# Patient Record
Sex: MALE | Race: WHITE | HISPANIC OR LATINO | URBAN - METROPOLITAN AREA
[De-identification: names, ages, dates, MRNs, and addresses within clinical notes are randomized per-mention and may not be internally consistent; named-entity substitution may affect disease eponyms.]

---

## 2018-01-23 ENCOUNTER — OUTPATIENT (OUTPATIENT)
Dept: OUTPATIENT SERVICES | Facility: HOSPITAL | Age: 53
LOS: 1 days | Discharge: HOME | End: 2018-01-23

## 2018-01-23 DIAGNOSIS — I25.10 ATHEROSCLEROTIC HEART DISEASE OF NATIVE CORONARY ARTERY WITHOUT ANGINA PECTORIS: ICD-10-CM

## 2019-04-11 ENCOUNTER — APPOINTMENT (OUTPATIENT)
Dept: UROLOGY | Facility: CLINIC | Age: 54
End: 2019-04-11
Payer: MEDICAID

## 2019-04-11 ENCOUNTER — OUTPATIENT (OUTPATIENT)
Dept: OUTPATIENT SERVICES | Facility: HOSPITAL | Age: 54
LOS: 1 days | Discharge: HOME | End: 2019-04-11

## 2019-04-11 VITALS
HEART RATE: 76 BPM | WEIGHT: 244 LBS | HEIGHT: 73 IN | DIASTOLIC BLOOD PRESSURE: 123 MMHG | SYSTOLIC BLOOD PRESSURE: 174 MMHG | BODY MASS INDEX: 32.34 KG/M2

## 2019-04-11 DIAGNOSIS — N52.01 ERECTILE DYSFUNCTION DUE TO ARTERIAL INSUFFICIENCY: ICD-10-CM

## 2019-04-11 DIAGNOSIS — N28.1 CYST OF KIDNEY, ACQUIRED: ICD-10-CM

## 2019-04-11 DIAGNOSIS — R97.20 ELEVATED PROSTATE SPECIFIC ANTIGEN [PSA]: ICD-10-CM

## 2019-04-11 DIAGNOSIS — R39.9 UNSPECIFIED SYMPTOMS AND SIGNS INVOLVING THE GENITOURINARY SYSTEM: ICD-10-CM

## 2019-04-11 DIAGNOSIS — Z80.42 FAMILY HISTORY OF MALIGNANT NEOPLASM OF PROSTATE: ICD-10-CM

## 2019-04-11 DIAGNOSIS — Z86.39 PERSONAL HISTORY OF OTHER ENDOCRINE, NUTRITIONAL AND METABOLIC DISEASE: ICD-10-CM

## 2019-04-11 DIAGNOSIS — Z86.79 PERSONAL HISTORY OF OTHER DISEASES OF THE CIRCULATORY SYSTEM: ICD-10-CM

## 2019-04-11 DIAGNOSIS — Z78.9 OTHER SPECIFIED HEALTH STATUS: ICD-10-CM

## 2019-04-11 PROCEDURE — 99205 OFFICE O/P NEW HI 60 MIN: CPT

## 2019-04-11 NOTE — ASSESSMENT
[FreeTextEntry1] : This is a 53 year male who presents with elevated PSA for the last 5 years.   most recent PSA from March 2019 showed PSA of 10.  states that has never had a prostate biopsy.  has lower back pain and arm pain thinks from arthritis. no blood in the urine.  uncle has prostate cancer. \par \par outside medical records from [facility] reviewed:\par normal creatinine 0.72\par PSA 10.5\par November 2018 sonogram: 2mm right kidney stone, complex left renal cysts, heterogeneous liver\par \par IPSS = severity of 23 pf 35.  drink a lot of water and coffee\par \par IIEF 5 = 11- severe ED\par

## 2019-04-11 NOTE — PHYSICAL EXAM
[General Appearance - Well Developed] : well developed [Well Groomed] : well groomed [General Appearance - Well Nourished] : well nourished [Normal Appearance] : normal appearance [General Appearance - In No Acute Distress] : no acute distress [Edema] : no peripheral edema [Respiration, Rhythm And Depth] : normal respiratory rhythm and effort [Exaggerated Use Of Accessory Muscles For Inspiration] : no accessory muscle use [Abdomen Soft] : soft [Costovertebral Angle Tenderness] : no ~M costovertebral angle tenderness [Abdomen Tenderness] : non-tender [No Prostate Nodules] : no prostate nodules [Prostate Size ___ gm] : prostate size [unfilled] gm [Skin Color & Pigmentation] : normal skin color and pigmentation [Normal Station and Gait] : the gait and station were normal for the patient's age [] : no rash [No Focal Deficits] : no focal deficits [Oriented To Time, Place, And Person] : oriented to person, place, and time [Mood] : the mood was normal [Affect] : the affect was normal [Not Anxious] : not anxious [Femoral Lymph Nodes Enlarged Bilaterally] : femoral [Inguinal Lymph Nodes Enlarged Bilaterally] : inguinal

## 2019-04-11 NOTE — HISTORY OF PRESENT ILLNESS
[FreeTextEntry1] : This is a 53 year male who presents with elevated PSA for the last 5 years.   most recent PSA from March 2019 showed PSA of 10.  states that has never had a prostate biopsy.  has lower back pain and arm pain thinks from arthritis. no blood in the urine.  uncle has prostate cancer. \par \par outside medical records from [facility] reviewed:\par normal creatinine 0.72\par PSA 10.5\par November 2018 sonogram: 2mm right kidney stone, complex left renal cysts, heterogeneous liver\par \par IPSS = severity of 23 pf 35.  drink a lot of water and coffee\par \par IIEF 5 = 11- severe ED\par

## 2019-04-16 LAB
ANION GAP SERPL CALC-SCNC: 10 MMOL/L
BUN SERPL-MCNC: 14 MG/DL
CALCIUM SERPL-MCNC: 8.5 MG/DL
CHLORIDE SERPL-SCNC: 105 MMOL/L
CO2 SERPL-SCNC: 23 MMOL/L
CREAT SERPL-MCNC: 0.8 MG/DL
GLUCOSE SERPL-MCNC: 155 MG/DL
POTASSIUM SERPL-SCNC: 4.3 MMOL/L
SODIUM SERPL-SCNC: 138 MMOL/L

## 2019-04-18 ENCOUNTER — APPOINTMENT (OUTPATIENT)
Dept: UROLOGY | Facility: CLINIC | Age: 54
End: 2019-04-18

## 2019-04-19 ENCOUNTER — FORM ENCOUNTER (OUTPATIENT)
Age: 54
End: 2019-04-19

## 2019-04-20 ENCOUNTER — OUTPATIENT (OUTPATIENT)
Dept: OUTPATIENT SERVICES | Facility: HOSPITAL | Age: 54
LOS: 1 days | Discharge: HOME | End: 2019-04-20
Payer: MEDICAID

## 2019-04-20 DIAGNOSIS — N28.1 CYST OF KIDNEY, ACQUIRED: ICD-10-CM

## 2019-04-20 PROCEDURE — 74178 CT ABD&PLV WO CNTR FLWD CNTR: CPT | Mod: 26

## 2019-05-09 ENCOUNTER — APPOINTMENT (OUTPATIENT)
Dept: UROLOGY | Facility: CLINIC | Age: 54
End: 2019-05-09

## 2019-05-09 ENCOUNTER — APPOINTMENT (OUTPATIENT)
Dept: UROLOGY | Facility: CLINIC | Age: 54
End: 2019-05-09
Payer: MEDICAID

## 2019-05-09 ENCOUNTER — OUTPATIENT (OUTPATIENT)
Dept: OUTPATIENT SERVICES | Facility: HOSPITAL | Age: 54
LOS: 1 days | Discharge: HOME | End: 2019-05-09

## 2019-05-09 VITALS
HEIGHT: 73 IN | HEART RATE: 85 BPM | DIASTOLIC BLOOD PRESSURE: 114 MMHG | SYSTOLIC BLOOD PRESSURE: 158 MMHG | BODY MASS INDEX: 31.81 KG/M2 | WEIGHT: 240 LBS

## 2019-05-09 DIAGNOSIS — N28.1 CYST OF KIDNEY, ACQUIRED: ICD-10-CM

## 2019-05-09 DIAGNOSIS — R97.20 ELEVATED PROSTATE SPECIFIC ANTIGEN [PSA]: ICD-10-CM

## 2019-05-09 DIAGNOSIS — D30.01 BENIGN NEOPLASM OF RIGHT KIDNEY: ICD-10-CM

## 2019-05-09 DIAGNOSIS — R39.9 UNSPECIFIED SYMPTOMS AND SIGNS INVOLVING THE GENITOURINARY SYSTEM: ICD-10-CM

## 2019-05-09 PROCEDURE — 99213 OFFICE O/P EST LOW 20 MIN: CPT

## 2019-05-09 NOTE — ASSESSMENT
[FreeTextEntry1] : This is a 53 year male who presents with elevated PSA for the last 5 years. most recent PSA from March 2019 showed PSA of 10. states that has never had a prostate biopsy. has lower back pain and arm pain thinks from arthritis. no blood in the urine. uncle has prostate cancer. is scheduled for prostate biopsy next week but Dr. Garcia advised for transperienal prostate biopsy instead\par \par outside medical records from [facility] reviewed:\par normal creatinine 0.72\par PSA 10.5\par November 2018 sonogram: 2mm right kidney stone, complex left renal cysts, heterogeneous liver\par \par IPSS = severity of 23 pf 35. drink a lot of water and coffee -- started on flomax last visit\par \par IIEF 5 = 11- severe ED\par \par April 2019\par Basic Metabolic Panel;- Cr 0.8\par CT Urogram w/wo Cont: left renal simple cysts up to 3cm.  right renal 0.5cm angiomyolipoma, prostate enlargement\par

## 2019-05-09 NOTE — PHYSICAL EXAM
[General Appearance - Well Developed] : well developed [General Appearance - Well Nourished] : well nourished [Normal Appearance] : normal appearance [Well Groomed] : well groomed [General Appearance - In No Acute Distress] : no acute distress [Abdomen Soft] : soft [Abdomen Tenderness] : non-tender [Costovertebral Angle Tenderness] : no ~M costovertebral angle tenderness [No Prostate Nodules] : no prostate nodules [Prostate Size ___ gm] : prostate size [unfilled] gm [Skin Color & Pigmentation] : normal skin color and pigmentation [Edema] : no peripheral edema [] : no respiratory distress [Respiration, Rhythm And Depth] : normal respiratory rhythm and effort [Exaggerated Use Of Accessory Muscles For Inspiration] : no accessory muscle use [Oriented To Time, Place, And Person] : oriented to person, place, and time [Affect] : the affect was normal [Mood] : the mood was normal [Normal Station and Gait] : the gait and station were normal for the patient's age [Not Anxious] : not anxious [No Focal Deficits] : no focal deficits [Inguinal Lymph Nodes Enlarged Bilaterally] : inguinal [Femoral Lymph Nodes Enlarged Bilaterally] : femoral

## 2019-05-09 NOTE — HISTORY OF PRESENT ILLNESS
[FreeTextEntry1] : This is a 53 year male who presents with elevated PSA for the last 5 years. most recent PSA from March 2019 showed PSA of 10. states that has never had a prostate biopsy. has lower back pain and arm pain thinks from arthritis. no blood in the urine. uncle has prostate cancer. is scheduled for prostate biopsy next week but Dr. Garcia advised for transperienal prostate biopsy instead\par \par outside medical records from [facility] reviewed:\par normal creatinine 0.72\par PSA 10.5\par November 2018 sonogram: 2mm right kidney stone, complex left renal cysts, heterogeneous liver\par \par IPSS = severity of 23 pf 35. drink a lot of water and coffee -- started on flomax last visit\par \par IIEF 5 = 11- severe ED\par \par April 2019\par Basic Metabolic Panel;- Cr 0.8\par CT Urogram w/wo Cont: left renal simple cysts up to 3cm.  right renal 0.5cm angiomyolipoma, prostate enlargement\par \par

## 2019-05-17 ENCOUNTER — APPOINTMENT (OUTPATIENT)
Dept: UROLOGY | Facility: CLINIC | Age: 54
End: 2019-05-17

## 2019-07-12 ENCOUNTER — APPOINTMENT (OUTPATIENT)
Dept: UROLOGY | Facility: CLINIC | Age: 54
End: 2019-07-12
Payer: MEDICAID

## 2019-07-12 VITALS
SYSTOLIC BLOOD PRESSURE: 145 MMHG | HEIGHT: 73 IN | HEART RATE: 76 BPM | WEIGHT: 240 LBS | BODY MASS INDEX: 31.81 KG/M2 | DIASTOLIC BLOOD PRESSURE: 98 MMHG

## 2019-07-12 PROCEDURE — 99213 OFFICE O/P EST LOW 20 MIN: CPT

## 2019-07-12 NOTE — LETTER BODY
[Dear  ___] : Dear  [unfilled], [Courtesy Letter:] : I had the pleasure of seeing your patient, [unfilled], in my office today. [Please see my note below.] : Please see my note below. [Sincerely,] : Sincerely, [FreeTextEntry2] : Dany Garcia MD\par [address]\[place] [address]\par

## 2019-07-12 NOTE — PHYSICAL EXAM
[General Appearance - Well Developed] : well developed [General Appearance - Well Nourished] : well nourished [Normal Appearance] : normal appearance [Well Groomed] : well groomed [] : no respiratory distress [General Appearance - In No Acute Distress] : no acute distress [Respiration, Rhythm And Depth] : normal respiratory rhythm and effort [Exaggerated Use Of Accessory Muscles For Inspiration] : no accessory muscle use [Affect] : the affect was normal [Mood] : the mood was normal [Oriented To Time, Place, And Person] : oriented to person, place, and time [Not Anxious] : not anxious [Normal Station and Gait] : the gait and station were normal for the patient's age

## 2019-07-12 NOTE — LETTER HEADER
[FreeTextEntry3] : Brian Swenson M.D.\par Director of Urology\par [facility]\par [address] [place] [address], [address]\[place] [address]

## 2019-07-12 NOTE — ASSESSMENT
[FreeTextEntry1] : I gave him four choices. One he can continue to wait we do have the probe we still don’t have appropriate needle guides and I don’t have a set time by when I will get it. However he is only 54 years old and his PSA was over 10 and I think we’ve waited long enough. \par Number two we can do it here the standard method up until now which is transrectal which depending on the study has between 2 and a 4% incidence of sepsis. I have not had someone have sepsis from this that I can recall but one of my associates had someone only a few months ago that spent over a week in the ICU because of this and that’s not my first choice. However he has no one to drive him back, his wife doesn’t drive his kids don’t drive and though he has friends with cars they are all working and he does not want to inconvenience any of them. He is willing to take the risk of sepsis and that is probably his first choice \par we still reviewed the other options which include having my staff once again trying coordinate my doing it at our the [facility] with me finally the option that I am recommending , knowing that he has trouble getting out there and back, is that he go to Dr. Aden who does them on a weekly basis. That way we don’t have to worry about when and how it will be coordinated he just goes there and gets it done. He can have the follow-up from the biopsy done here and that would not be a problem both for the exam and to review the results. \par

## 2019-07-12 NOTE — HISTORY OF PRESENT ILLNESS
[FreeTextEntry1] : Arie was seen in May by Dr. Sunshine who wanted to do a trans rectal biopsy. Because of the increased risks of sepsis his primary care doctor really wanted us to do a trans perineal. At that time we were fairly sure we would have the equipment sooner than later or that I personally would have the ability to do it out at our center in [place] so Arie was waiting. We finally got the perineal probe and were going to do the biopsy here in and we found out the equipment that we got here did not include the necessary accoutrements so we could do it safely. \par \par He was called it today so we can discuss the methods by which we can have it done perineal and the risks if he chose not to accept the inconveniences of the perineal route of doing it transrectal. \par

## 2019-07-31 ENCOUNTER — APPOINTMENT (OUTPATIENT)
Dept: UROLOGY | Facility: CLINIC | Age: 54
End: 2019-07-31

## 2019-08-09 ENCOUNTER — APPOINTMENT (OUTPATIENT)
Dept: UROLOGY | Facility: CLINIC | Age: 54
End: 2019-08-09

## 2019-08-28 ENCOUNTER — EMERGENCY (EMERGENCY)
Facility: HOSPITAL | Age: 54
LOS: 0 days | Discharge: HOME | End: 2019-08-28
Attending: EMERGENCY MEDICINE | Admitting: EMERGENCY MEDICINE
Payer: MEDICAID

## 2019-08-28 VITALS
SYSTOLIC BLOOD PRESSURE: 199 MMHG | HEART RATE: 75 BPM | OXYGEN SATURATION: 98 % | RESPIRATION RATE: 20 BRPM | WEIGHT: 240.08 LBS | DIASTOLIC BLOOD PRESSURE: 177 MMHG | TEMPERATURE: 97 F | HEIGHT: 73 IN

## 2019-08-28 VITALS
HEART RATE: 74 BPM | DIASTOLIC BLOOD PRESSURE: 104 MMHG | SYSTOLIC BLOOD PRESSURE: 175 MMHG | RESPIRATION RATE: 18 BRPM | OXYGEN SATURATION: 98 %

## 2019-08-28 DIAGNOSIS — R07.89 OTHER CHEST PAIN: ICD-10-CM

## 2019-08-28 DIAGNOSIS — R06.02 SHORTNESS OF BREATH: ICD-10-CM

## 2019-08-28 DIAGNOSIS — R00.2 PALPITATIONS: ICD-10-CM

## 2019-08-28 DIAGNOSIS — R10.13 EPIGASTRIC PAIN: ICD-10-CM

## 2019-08-28 DIAGNOSIS — I10 ESSENTIAL (PRIMARY) HYPERTENSION: ICD-10-CM

## 2019-08-28 DIAGNOSIS — E78.5 HYPERLIPIDEMIA, UNSPECIFIED: ICD-10-CM

## 2019-08-28 DIAGNOSIS — R42 DIZZINESS AND GIDDINESS: ICD-10-CM

## 2019-08-28 LAB
ALBUMIN SERPL ELPH-MCNC: 4.6 G/DL — SIGNIFICANT CHANGE UP (ref 3.5–5.2)
ALP SERPL-CCNC: 128 U/L — HIGH (ref 30–115)
ALT FLD-CCNC: 51 U/L — HIGH (ref 0–41)
ANION GAP SERPL CALC-SCNC: 11 MMOL/L — SIGNIFICANT CHANGE UP (ref 7–14)
APTT BLD: 34.9 SEC — SIGNIFICANT CHANGE UP (ref 27–39.2)
AST SERPL-CCNC: 15 U/L — SIGNIFICANT CHANGE UP (ref 0–41)
BASOPHILS # BLD AUTO: 0.06 K/UL — SIGNIFICANT CHANGE UP (ref 0–0.2)
BASOPHILS NFR BLD AUTO: 0.7 % — SIGNIFICANT CHANGE UP (ref 0–1)
BILIRUB SERPL-MCNC: 0.2 MG/DL — SIGNIFICANT CHANGE UP (ref 0.2–1.2)
BUN SERPL-MCNC: 14 MG/DL — SIGNIFICANT CHANGE UP (ref 10–20)
CALCIUM SERPL-MCNC: 9.4 MG/DL — SIGNIFICANT CHANGE UP (ref 8.5–10.1)
CHLORIDE SERPL-SCNC: 102 MMOL/L — SIGNIFICANT CHANGE UP (ref 98–110)
CO2 SERPL-SCNC: 23 MMOL/L — SIGNIFICANT CHANGE UP (ref 17–32)
CREAT SERPL-MCNC: 0.8 MG/DL — SIGNIFICANT CHANGE UP (ref 0.7–1.5)
EOSINOPHIL # BLD AUTO: 0.2 K/UL — SIGNIFICANT CHANGE UP (ref 0–0.7)
EOSINOPHIL NFR BLD AUTO: 2.4 % — SIGNIFICANT CHANGE UP (ref 0–8)
GLUCOSE SERPL-MCNC: 274 MG/DL — HIGH (ref 70–99)
HCT VFR BLD CALC: 42.9 % — SIGNIFICANT CHANGE UP (ref 42–52)
HGB BLD-MCNC: 14.7 G/DL — SIGNIFICANT CHANGE UP (ref 14–18)
IMM GRANULOCYTES NFR BLD AUTO: 0.6 % — HIGH (ref 0.1–0.3)
INR BLD: 1.06 RATIO — SIGNIFICANT CHANGE UP (ref 0.65–1.3)
LIDOCAIN IGE QN: 25 U/L — SIGNIFICANT CHANGE UP (ref 7–60)
LYMPHOCYTES # BLD AUTO: 2.83 K/UL — SIGNIFICANT CHANGE UP (ref 1.2–3.4)
LYMPHOCYTES # BLD AUTO: 34.4 % — SIGNIFICANT CHANGE UP (ref 20.5–51.1)
MAGNESIUM SERPL-MCNC: 2 MG/DL — SIGNIFICANT CHANGE UP (ref 1.8–2.4)
MCHC RBC-ENTMCNC: 27.8 PG — SIGNIFICANT CHANGE UP (ref 27–31)
MCHC RBC-ENTMCNC: 34.3 G/DL — SIGNIFICANT CHANGE UP (ref 32–37)
MCV RBC AUTO: 81.3 FL — SIGNIFICANT CHANGE UP (ref 80–94)
MONOCYTES # BLD AUTO: 0.66 K/UL — HIGH (ref 0.1–0.6)
MONOCYTES NFR BLD AUTO: 8 % — SIGNIFICANT CHANGE UP (ref 1.7–9.3)
NEUTROPHILS # BLD AUTO: 4.42 K/UL — SIGNIFICANT CHANGE UP (ref 1.4–6.5)
NEUTROPHILS NFR BLD AUTO: 53.9 % — SIGNIFICANT CHANGE UP (ref 42.2–75.2)
NRBC # BLD: 0 /100 WBCS — SIGNIFICANT CHANGE UP (ref 0–0)
NT-PROBNP SERPL-SCNC: 103 PG/ML — SIGNIFICANT CHANGE UP (ref 0–300)
PLATELET # BLD AUTO: 261 K/UL — SIGNIFICANT CHANGE UP (ref 130–400)
POTASSIUM SERPL-MCNC: 4 MMOL/L — SIGNIFICANT CHANGE UP (ref 3.5–5)
POTASSIUM SERPL-SCNC: 4 MMOL/L — SIGNIFICANT CHANGE UP (ref 3.5–5)
PROT SERPL-MCNC: 7.7 G/DL — SIGNIFICANT CHANGE UP (ref 6–8)
PROTHROM AB SERPL-ACNC: 12.2 SEC — SIGNIFICANT CHANGE UP (ref 9.95–12.87)
RBC # BLD: 5.28 M/UL — SIGNIFICANT CHANGE UP (ref 4.7–6.1)
RBC # FLD: 13.3 % — SIGNIFICANT CHANGE UP (ref 11.5–14.5)
SODIUM SERPL-SCNC: 136 MMOL/L — SIGNIFICANT CHANGE UP (ref 135–146)
TROPONIN T SERPL-MCNC: <0.01 NG/ML — SIGNIFICANT CHANGE UP
WBC # BLD: 8.22 K/UL — SIGNIFICANT CHANGE UP (ref 4.8–10.8)
WBC # FLD AUTO: 8.22 K/UL — SIGNIFICANT CHANGE UP (ref 4.8–10.8)

## 2019-08-28 PROCEDURE — 99285 EMERGENCY DEPT VISIT HI MDM: CPT

## 2019-08-28 PROCEDURE — 71046 X-RAY EXAM CHEST 2 VIEWS: CPT | Mod: 26

## 2019-08-28 PROCEDURE — 93010 ELECTROCARDIOGRAM REPORT: CPT

## 2019-08-28 RX ORDER — ASPIRIN/CALCIUM CARB/MAGNESIUM 324 MG
324 TABLET ORAL ONCE
Refills: 0 | Status: COMPLETED | OUTPATIENT
Start: 2019-08-28 | End: 2019-08-28

## 2019-08-28 RX ADMIN — Medication 324 MILLIGRAM(S): at 01:44

## 2019-08-28 NOTE — ED PROVIDER NOTE - OBJECTIVE STATEMENT
55 y/o M with PMH DM on insulin, HTN, HLD on asa presents with shortness of breath, lightheadedness, palpitations, moderate L sided constant throbbing chest pressure lasting 4 hrs x tonight. +nausea with 2 episodes of non-bloody non-bilious vomiting. last stress 1 year ago and normal. also relates that at onset he had epigastric pain radiating to back, which resolved PTA. no palliating/provoking factors, but relates that symptoms are remitting now. no hx MI/Fhx MI @ <67 y/o. He relates that at onset of symptoms found his BG to be 325 so he took 16U of insulin at that time. no diarrhea/ current ab or back symptoms/fever/cough. Denies hemoptysis, recent surgery/immobilization, hx cancers, hx PE/DVT, hormone use.     PCP Jose  cardio is on [address], does not remember name.

## 2019-08-28 NOTE — ED PROVIDER NOTE - PHYSICAL EXAMINATION
PHYSICAL EXAM:    GENERAL: Alert, appears stated age, well appearing, non-toxic  SKIN: Warm, pink and dry. MMM.    EYE: Normal lids/conjunctiva, PERRL, EOMI  ENT: Normal hearing, patent oropharynx  NECK: +supple. No meningismus, or JVD  Pulm: Bilateral BS, normal resp effort, no wheezes, stridor, or retractions  CV: RRR, no M/R/G, 2+and = radial pulses  Abd: soft, non-tender, non-distended, no rebound/guarding. no RUQ/RLQ TTP. no CVA tenderness.   Mskel: no erythema, cyanosis, edema. no calf tenderness  Neuro: AAOx3,  5/5 strength throughout. normal gait.

## 2019-08-28 NOTE — ED ADULT NURSE NOTE - OBJECTIVE STATEMENT
patient complaints of chest pain, n/v x 2 and SOB on exercetion. Patient OSat of 97%on RA. Patient also complaints of increased blood pressure and increased Blood sugar at home.

## 2019-08-28 NOTE — ED PROVIDER NOTE - CARE PROVIDER_API CALL
Adán Sanford)  Cardiovascular Disease; Interventional Cardiology  [address] [place] [address]  Phone: [phone]  Fax: [phone]  Follow Up Time: 1-3 Days

## 2019-08-28 NOTE — ED PROVIDER NOTE - PROGRESS NOTE DETAILS
pt refusing CTA. cousneled on risks. pt understands this could lead to death/disability. pt also refusing further testing/admission/observastion. The patient wishes to leave against medical advice.  I have discussed the risks, benefits and alternatives (including the possibility of worsening of disease, pain, permanent disability, and/or death) with the patient and his/her family (if available).  The patient voices understanding of these risks, benefits, and alternatives and still wishes to sign out against medical advice.  The patient is awake, alert, oriented  x 3 and has demonstrated capacity to refuse/direct care.  I have advised the patient that they can and should return immediately should they develop any worse/different/additional symptoms, or if they change their mind and want to continue their care.

## 2019-08-28 NOTE — ED PROVIDER NOTE - ATTENDING CONTRIBUTION TO CARE
I personally evaluated the patient. I reviewed the Resident’s or Physician Assistant’s note (as assigned above), and agree with the findings and plan except as documented in my note.    53 y/o M with PMH DM on insulin, HTN, HLD on asa presents with shortness of breath, lightheadedness, palpitations and L sided constant throbbing chest pressure lasting 4 hrs x tonight. No sob. No PE risk factors. No back pain. No fevers, chills. No cough. No leg swelling or pain.     CONSTITUTIONAL: Well-developed; well-nourished; in no acute distress. Sitting up and providing appropriate history and physical examination  SKIN: skin exam is warm and dry, no acute rash.  HEAD: Normocephalic; atraumatic.  EYES: PERRL, 3 mm bilateral, no nystagmus, EOM intact; conjunctiva and sclera clear.  ENT: No nasal discharge; airway clear.  NECK: Supple; non tender.+ full passive ROM in all directions. No JVD  CARD: S1, S2 normal; no murmurs, gallops, or rubs. Regular rate and rhythm. + Symmetric Strong Pulses  RESP: No wheezes, rales or rhonchi. Good air movement bilaterally  ABD: soft; non-distended; non-tender. No Rebound, No Gaurding, No signs of peritnitis, No CVA tenderness  EXT: Normal ROM. No clubbing, cyanosis or edema. Dp and Pt Pulses intact. Cap refill less than 3 seconds  NEURO: CN 2-12 intact, normal finger to nose, normal romberg, stable gait, no sensory or motor deficits, Alert, oriented, grossly unremarkable. No Focal deficits. GCS 15. NIH 0  PSYCH: Cooperative, appropriate.    Plan- CTA to r/o dissection, labs, ECG, reassess

## 2019-08-28 NOTE — ED PROVIDER NOTE - CARE PLAN
Principal Discharge DX:	Chest pain  Secondary Diagnosis:	Back pain  Secondary Diagnosis:	Abdominal pain  Secondary Diagnosis:	Lightheadedness  Secondary Diagnosis:	Palpitations

## 2019-08-28 NOTE — ED PROVIDER NOTE - PATIENT PORTAL LINK FT
You can access the FollowMyHealth Patient Portal offered by [facility] by registering at the following website: [email or web address]. By joining [employer]’s FollowMyHealth portal, you will also be able to view your health information using other applications (apps) compatible with our system.

## 2019-08-28 NOTE — ED PROVIDER NOTE - NS ED ROS FT
Review of Systems    Constitutional: (-) fever   Eyes/ENT: (-) vision changes  Cardiovascular: (+) chest pain, (-) syncope (-) palpitations  Respiratory: (-) cough, (+) shortness of breath  Gastrointestinal: (+) vomiting, (-) diarrhea (-)black/bloody stools (+) abdominal pain  Genitourinary:  (-) dysuria   Musculoskeletal: (-) neck pain, (+) back pain, (-) leg pain/swelling  Integumentary: (-) rash, (-) edema  Neurological: (-) headache  Hematologic: (-) easy bruising   Allergic/Immunologic: (-) pruritus

## 2019-08-28 NOTE — ED ADULT NURSE NOTE - NSIMPLEMENTINTERV_GEN_ALL_ED
Implemented All Universal Safety Interventions:  [place] to call system. Call bell, personal items and telephone within reach. Instruct patient to call for assistance. Room bathroom lighting operational. Non-slip footwear when patient is off stretcher. Physically safe environment: no spills, clutter or unnecessary equipment. Stretcher in lowest position, wheels locked, appropriate side rails in place.

## 2019-08-28 NOTE — ED PROVIDER NOTE - CLINICAL SUMMARY MEDICAL DECISION MAKING FREE TEXT BOX
Patient now wishes to leave AMA and does not wish to stay for further eval. The patient wishes to leave against medical advice.  I have discussed the risks, benefits and alternatives (including the possibility of worsening of disease, pain, permanent disability, and/or death) with the patient and his/her family (if available).  The patient voices understanding of these risks, benefits, and alternatives and still wishes to sign out against medical advice.  The patient is awake, alert, oriented  x 3 and has demonstrated capacity to refuse/direct care.  I have advised the patient that they can and should return immediately should they develop any worse/different/additional symptoms, or if they change their mind and want to continue their care.

## 2019-10-23 ENCOUNTER — APPOINTMENT (OUTPATIENT)
Dept: UROLOGY | Facility: CLINIC | Age: 54
End: 2019-10-23
Payer: MEDICAID

## 2019-10-23 VITALS
SYSTOLIC BLOOD PRESSURE: 142 MMHG | HEART RATE: 87 BPM | BODY MASS INDEX: 31.81 KG/M2 | WEIGHT: 240 LBS | DIASTOLIC BLOOD PRESSURE: 89 MMHG | HEIGHT: 73 IN

## 2019-10-23 PROCEDURE — 99213 OFFICE O/P EST LOW 20 MIN: CPT

## 2019-10-23 RX ORDER — TAMSULOSIN HYDROCHLORIDE 0.4 MG/1
0.4 CAPSULE ORAL
Qty: 30 | Refills: 3 | Status: COMPLETED | COMMUNITY
Start: 2019-04-11 | End: 2019-10-23

## 2019-10-23 NOTE — PHYSICAL EXAM
[General Appearance - Well Developed] : well developed [Normal Appearance] : normal appearance [General Appearance - Well Nourished] : well nourished [Well Groomed] : well groomed [General Appearance - In No Acute Distress] : no acute distress [Abdomen Tenderness] : non-tender [Costovertebral Angle Tenderness] : no ~M costovertebral angle tenderness [Abdomen Soft] : soft [Edema] : no peripheral edema [Exaggerated Use Of Accessory Muscles For Inspiration] : no accessory muscle use [] : no respiratory distress [Respiration, Rhythm And Depth] : normal respiratory rhythm and effort [Affect] : the affect was normal [Mood] : the mood was normal [Oriented To Time, Place, And Person] : oriented to person, place, and time [Not Anxious] : not anxious [Normal Station and Gait] : the gait and station were normal for the patient's age [No Focal Deficits] : no focal deficits

## 2019-10-24 ENCOUNTER — OUTPATIENT (OUTPATIENT)
Dept: OUTPATIENT SERVICES | Facility: HOSPITAL | Age: 54
LOS: 1 days | Discharge: HOME | End: 2019-10-24

## 2019-10-24 DIAGNOSIS — R97.20 ELEVATED PROSTATE SPECIFIC ANTIGEN [PSA]: ICD-10-CM

## 2019-10-24 LAB
APPEARANCE: CLEAR
BILIRUBIN URINE: NEGATIVE
BLOOD URINE: NEGATIVE
COLOR: YELLOW
GLUCOSE QUALITATIVE U: NORMAL
KETONES URINE: NEGATIVE
LEUKOCYTE ESTERASE URINE: NEGATIVE
NITRITE URINE: NEGATIVE
PH URINE: 6
PROTEIN URINE: NORMAL
SPECIFIC GRAVITY URINE: 1.03
UROBILINOGEN URINE: NORMAL

## 2019-10-28 DIAGNOSIS — Z87.440 PERSONAL HISTORY OF URINARY (TRACT) INFECTIONS: ICD-10-CM

## 2019-10-28 LAB — BACTERIA UR CULT: ABNORMAL

## 2019-10-28 NOTE — HISTORY OF PRESENT ILLNESS
[None] : no symptoms [FreeTextEntry1] : CAMILLE DEUTSCH is a 54 year old male with a past medical history of elevated PSA. Presents to the office today for a follow up after he failed to follow up in 7/2019 for prostate biopsy and cancelled numerous appointments over the summer. Patient  had a PSA of  10.5 ng/ml on 3/2019 with a negative urinalysis.New PSA is 18.1 ng/ml and 5.2 % Free PSA. No urinary issues, voiding well. Denies dysuria, gross hematuria, flank pain, fever, or chills. Denies family history of elevated PSA. Non smoker. IPSS 8- he is satisfied with his urination.\par \par Patient scheduled for prostate biopsy. . Patient instructed to avoid blood thinners 1 week before and after prostate. Patient made aware that he will experience blood in the urine, stool, or sperm for a couple of weeks. Patient aware of risks from prostate biopsy of bleeding and infections post biopsy and that if he develops fever or chills he will have to go to ER for IV antibiotics. Patient instructed to call office if he has difficulty urinating. Patient verbalizes understanding.\par

## 2019-10-28 NOTE — ASSESSMENT
[FreeTextEntry1] : CAMILLE DEUTSCH is a 54 year old male with a past medical history of elevated PSA. Presents to the office today for a follow up after he failed to follow up in 7/2019 for prostate biopsy and cancelled numerous appointments over the summer. Patient  had a PSA of  10.5 ng/ml on 3/2019 with a negative urinalysis.New PSA is 18.1 ng/ml and 5.2 % Free PSA. No urinary issues, voiding well. Denies dysuria, gross hematuria, flank pain, fever, or chills. Denies family history of elevated PSA. Non smoker. IPSS 8- he is satisfied with his urination.\par \par Patient scheduled for prostate biopsy. . Patient instructed to avoid blood thinners 1 week before and after prostate. Patient made aware that he will experience blood in the urine, stool, or sperm for a couple of weeks. Patient aware of risks from prostate biopsy of bleeding and infections post biopsy and that if he develops fever or chills he will have to go to ER for IV antibiotics. Patient instructed to call office if he has difficulty urinating. Patient verbalizes understanding.\par

## 2019-10-29 ENCOUNTER — APPOINTMENT (OUTPATIENT)
Dept: PULMONOLOGY | Facility: CLINIC | Age: 54
End: 2019-10-29
Payer: MEDICAID

## 2019-10-29 ENCOUNTER — OUTPATIENT (OUTPATIENT)
Dept: OUTPATIENT SERVICES | Facility: HOSPITAL | Age: 54
LOS: 1 days | Discharge: HOME | End: 2019-10-29

## 2019-10-29 VITALS
DIASTOLIC BLOOD PRESSURE: 90 MMHG | SYSTOLIC BLOOD PRESSURE: 135 MMHG | BODY MASS INDEX: 30.88 KG/M2 | WEIGHT: 233 LBS | HEIGHT: 73 IN | HEART RATE: 89 BPM | OXYGEN SATURATION: 95 %

## 2019-10-29 DIAGNOSIS — Z80.0 FAMILY HISTORY OF MALIGNANT NEOPLASM OF DIGESTIVE ORGANS: ICD-10-CM

## 2019-10-29 DIAGNOSIS — Z82.49 FAMILY HISTORY OF ISCHEMIC HEART DISEASE AND OTHER DISEASES OF THE CIRCULATORY SYSTEM: ICD-10-CM

## 2019-10-29 DIAGNOSIS — Z82.5 FAMILY HISTORY OF ASTHMA AND OTHER CHRONIC LOWER RESPIRATORY DISEASES: ICD-10-CM

## 2019-10-29 PROCEDURE — 99204 OFFICE O/P NEW MOD 45 MIN: CPT | Mod: GC

## 2019-10-29 RX ORDER — METOPROLOL SUCCINATE 200 MG/1
200 TABLET, EXTENDED RELEASE ORAL
Refills: 0 | Status: DISCONTINUED | COMMUNITY
End: 2019-10-29

## 2019-10-29 RX ORDER — HYDROCHLOROTHIAZIDE 25 MG/1
25 TABLET ORAL
Refills: 0 | Status: DISCONTINUED | COMMUNITY
End: 2019-10-29

## 2019-10-29 RX ORDER — METFORMIN HYDROCHLORIDE 1000 MG/1
1000 TABLET, FILM COATED, EXTENDED RELEASE ORAL
Refills: 0 | Status: DISCONTINUED | COMMUNITY
End: 2019-10-29

## 2019-10-29 RX ORDER — LISINOPRIL 40 MG/1
40 TABLET ORAL
Refills: 0 | Status: DISCONTINUED | COMMUNITY
End: 2019-10-29

## 2019-10-29 RX ORDER — CIPROFLOXACIN HYDROCHLORIDE 500 MG/1
500 TABLET, FILM COATED ORAL
Qty: 2 | Refills: 0 | Status: DISCONTINUED | COMMUNITY
Start: 2019-04-11 | End: 2019-10-29

## 2019-10-29 RX ORDER — DULAGLUTIDE 1.5 MG/.5ML
1.5 INJECTION, SOLUTION SUBCUTANEOUS
Refills: 0 | Status: DISCONTINUED | COMMUNITY
End: 2019-10-29

## 2019-10-29 RX ORDER — CIPROFLOXACIN HYDROCHLORIDE 500 MG/1
500 TABLET, FILM COATED ORAL
Qty: 2 | Refills: 0 | Status: DISCONTINUED | COMMUNITY
Start: 2019-06-26 | End: 2019-10-29

## 2019-10-31 LAB
ALBUMIN SERPL ELPH-MCNC: 4.3 G/DL
ALP BLD-CCNC: 74 U/L
ALT SERPL-CCNC: 35 U/L
ANION GAP SERPL CALC-SCNC: 12 MMOL/L
AST SERPL-CCNC: 11 U/L
BILIRUB SERPL-MCNC: 0.4 MG/DL
BUN SERPL-MCNC: 16 MG/DL
CALCIUM SERPL-MCNC: 9.4 MG/DL
CHLORIDE SERPL-SCNC: 102 MMOL/L
CO2 SERPL-SCNC: 25 MMOL/L
CREAT SERPL-MCNC: 0.6 MG/DL
GLUCOSE SERPL-MCNC: 107 MG/DL
POTASSIUM SERPL-SCNC: 4.2 MMOL/L
PROT SERPL-MCNC: 7.3 G/DL
SODIUM SERPL-SCNC: 139 MMOL/L

## 2019-11-05 ENCOUNTER — OUTPATIENT (OUTPATIENT)
Dept: OUTPATIENT SERVICES | Facility: HOSPITAL | Age: 54
LOS: 1 days | Discharge: HOME | End: 2019-11-05
Payer: MEDICAID

## 2019-11-05 ENCOUNTER — OUTPATIENT (OUTPATIENT)
Dept: OUTPATIENT SERVICES | Facility: HOSPITAL | Age: 54
LOS: 1 days | Discharge: HOME | End: 2019-11-05

## 2019-11-05 DIAGNOSIS — R06.02 SHORTNESS OF BREATH: ICD-10-CM

## 2019-11-05 PROCEDURE — 93306 TTE W/DOPPLER COMPLETE: CPT | Mod: 26

## 2019-11-06 ENCOUNTER — OUTPATIENT (OUTPATIENT)
Dept: OUTPATIENT SERVICES | Facility: HOSPITAL | Age: 54
LOS: 1 days | Discharge: HOME | End: 2019-11-06

## 2019-11-06 DIAGNOSIS — N39.0 URINARY TRACT INFECTION, SITE NOT SPECIFIED: ICD-10-CM

## 2019-11-06 LAB
APPEARANCE: CLEAR
BILIRUBIN URINE: NEGATIVE
BLOOD URINE: NEGATIVE
COLOR: YELLOW
GLUCOSE QUALITATIVE U: NEGATIVE
KETONES URINE: NEGATIVE
LEUKOCYTE ESTERASE URINE: NEGATIVE
NITRITE URINE: NEGATIVE
PH URINE: 6
PROTEIN URINE: NORMAL
SPECIFIC GRAVITY URINE: 1.03
UROBILINOGEN URINE: NORMAL

## 2019-11-11 LAB — BACTERIA UR CULT: NORMAL

## 2019-11-27 ENCOUNTER — INBOUND DOCUMENT (OUTPATIENT)
Age: 54
End: 2019-11-27

## 2019-12-04 ENCOUNTER — APPOINTMENT (OUTPATIENT)
Dept: UROLOGY | Facility: CLINIC | Age: 54
End: 2019-12-04

## 2019-12-06 ENCOUNTER — APPOINTMENT (OUTPATIENT)
Dept: UROLOGY | Facility: CLINIC | Age: 54
End: 2019-12-06
Payer: MEDICAID

## 2019-12-06 ENCOUNTER — OUTPATIENT (OUTPATIENT)
Dept: OUTPATIENT SERVICES | Facility: HOSPITAL | Age: 54
LOS: 1 days | Discharge: HOME | End: 2019-12-06

## 2019-12-06 ENCOUNTER — LABORATORY RESULT (OUTPATIENT)
Age: 54
End: 2019-12-06

## 2019-12-06 PROCEDURE — 55700: CPT

## 2019-12-06 PROCEDURE — 76872 US TRANSRECTAL: CPT

## 2019-12-09 DIAGNOSIS — R97.20 ELEVATED PROSTATE SPECIFIC ANTIGEN [PSA]: ICD-10-CM

## 2019-12-17 ENCOUNTER — OUTPATIENT (OUTPATIENT)
Dept: OUTPATIENT SERVICES | Facility: HOSPITAL | Age: 54
LOS: 1 days | Discharge: HOME | End: 2019-12-17

## 2019-12-17 ENCOUNTER — APPOINTMENT (OUTPATIENT)
Dept: PULMONOLOGY | Facility: CLINIC | Age: 54
End: 2019-12-17
Payer: MEDICAID

## 2019-12-17 VITALS
BODY MASS INDEX: 30.22 KG/M2 | HEIGHT: 73 IN | OXYGEN SATURATION: 95 % | HEART RATE: 98 BPM | SYSTOLIC BLOOD PRESSURE: 167 MMHG | WEIGHT: 228 LBS | DIASTOLIC BLOOD PRESSURE: 105 MMHG

## 2019-12-17 PROCEDURE — 99213 OFFICE O/P EST LOW 20 MIN: CPT | Mod: GC

## 2019-12-18 NOTE — HISTORY OF PRESENT ILLNESS
[FreeTextEntry1] : 53 y/o w/ PMHx DM, HTN, HLD, enlarged prostate, chronic neck and back pain who presents for SOB and chest pain. SOB has been present for a few years off and on. However over the last year it has been worsening. It is worst on exertion, he can walk half a block or 6 stairs before he has to take a break. Cannot lay flat, has to sleep with additional pillows. Along with shortness of breath he has chest tightness when exerting himself. He has a cough which he reports is most noticeable after exertion, with frequent phlegm production. Phlegm is thick and white, produces roughly 3/4 of a cup throughout the day. Endorses weight loss, 10 pounds over 3-4 months - not intentional. Also reports that he has frequent upper respiratory infections. Denies fever, Had thoracic surgery for chest wall deformation. \par \par Of note he had PFTs done with his PMD prior to presentation in current office. FVC is 25% of predicted, FEV1 34% of predicted, FEV1/% of predicted.\par \par Reports that he has no pets and no known allergies

## 2019-12-18 NOTE — PHYSICAL EXAM
[General Appearance - Well Developed] : well developed [Normal Appearance] : normal appearance [General Appearance - Well Nourished] : well nourished [Heart Rate And Rhythm] : heart rate and rhythm were normal [Heart Sounds] : normal S1 and S2 [Murmurs] : no murmurs present [] : no respiratory distress [Auscultation Breath Sounds / Voice Sounds] : lungs were clear to auscultation bilaterally [Surgical scars] : surgical scars [Bowel Sounds] : normal bowel sounds [Abdomen Soft] : soft [Abdomen Tenderness] : non-tender [Non-Pitting] : non-pitting [FreeTextEntry1] : Patient w/ sunken chest appearance

## 2019-12-18 NOTE — ASSESSMENT
[FreeTextEntry1] : #Chronic shortness of breath\par - PFTs from PMD shows Restrictive pattern:\par - FVC 25% of predictive\par - FEV1 34% of predictive\par - FEV1/%\par - Will repeat PFTs with complete study and bronchodilators\par - Interstitial lung disease less likely\par - CMP to evaluate for Bicarb elevation\par - Echocardiogram to evaluate for pulmonary hypertension\par - No evidence of heart failure on physical exam, BNP normal in the ED\par  \par

## 2019-12-18 NOTE — REVIEW OF SYSTEMS
[Cough] : cough [Sputum] : sputum  [Dyspnea] : dyspnea [Chest Tightness] : chest tightness [Frequent URIs] : frequent upper respiratory infections [Wheezing] : wheezing [Recent Wt Loss (___ Lbs)] : recent [unfilled] ~Ulb weight loss [Hypertension] : ~T hypertension [Chest Discomfort] : chest discomfort [Orthopnea] : orthopnea [Edema] : ~T edema was present [Claudication] : intermittent claudication [Back Pain] : ~T back pain [Fever] : no fever [Chills] : no chills [Poor Appetite] : normal appetite  [Fatigue] : no fatigue [Recent Wt Gain (___ Lbs)] : no recent weight gain [Eye Irritation] : no ~T irritation of the eyes [Dry Eyes] : no dryness of the eyes [Nasal Congestion] : no nasal congestion [Ear Disturbance] : no ear disturbance [Hemoptysis] : no hemoptysis [Dysrhythmia] : no dysrhythmia [Pleuritic Pain] : no pleuritic pain [Hypotension] : no hypotension [Palpitations] : no palpitations [Murmurs] : no murmurs were heard [Heartburn] : no heartburn [Reflux] : no reflux [Indigestion] : no indigestion [Dysphagia] : no dysphagia [Nausea] : no nausea [Vomiting] : no vomiting [Constipation] : no constipation [Diarrhea] : no diarrhea [Food Intolerance] : no ~T food intolerance [Abdominal Pain] : no abdominal pain [Bleeding] : no bleeding [Hepatic Disease] : no hepatic disease [Dysuria] : no dysuria

## 2019-12-19 NOTE — REASON FOR VISIT
[Chest Pain] : chest Pain [Cough] : cough [Follow-Up] : a follow-up visit [FreeTextEntry2] : dyspnea on exertion

## 2019-12-19 NOTE — REVIEW OF SYSTEMS
[Cough] : cough [Sputum] : sputum  [Dyspnea] : dyspnea [Hypertension] : ~T hypertension [As Noted in HPI] : as noted in HPI [Fever] : no fever [Pleuritic Pain] : no pleuritic pain [Chest Tightness] : no chest tightness [Chills] : no chills [Wheezing] : no wheezing [Palpitations] : no palpitations [Edema] : ~T edema was not present

## 2019-12-19 NOTE — PHYSICAL EXAM
[General Appearance - Well Developed] : well developed [Normal Appearance] : normal appearance [Normal Oropharynx] : normal oropharynx [Jugular Venous Distention Increased] : there was no jugular-venous distention [Heart Sounds] : normal S1 and S2 [Murmurs] : no murmurs present [Edema] : no peripheral edema present [] : no respiratory distress [Exaggerated Use Of Accessory Muscles For Inspiration] : no accessory muscle use [Auscultation Breath Sounds / Voice Sounds] : lungs were clear to auscultation bilaterally [Surgical scars] : surgical scars [Bowel Sounds] : normal bowel sounds [Abdomen Tenderness] : non-tender [Nail Clubbing] : no clubbing of the fingernails [Cyanosis, Localized] : no localized cyanosis [No Focal Deficits] : no focal deficits [Oriented To Time, Place, And Person] : oriented to person, place, and time [FreeTextEntry1] : severe pectus excavatum  [FreeTextEntry2] : no LE edema + varicose veins

## 2019-12-19 NOTE — ASSESSMENT
[FreeTextEntry1] : #Chronic shortness of breath with exertion ;\par - hx of chest wall surgery in child [place] at age of 11 \par -severe pectus excavatum \par - PFTs from PMD shows Restrictive pattern:\par - FVC 25% of predictive\par - FEV1 34% of predictive\par - FEV1/%\par - repeat PFTs with complete study and bronchodilators; are not interpretable due to poor effort \par pt has another PFTs 10/19 ,which showed restrictive pattern with corrective DLco of 123\par - CMP  for Bicarb was within normal range too.(no indication for NIV)\par - pulse ox at rest and with exertion was within normal limit \par - Echocardiogram to evaluate for pulmonary hypertension was within nl limit with 49% EF\par ->>Interstitial lung disease less likely but likely explanation for pt PFTs and symptom could be restrictive lung disease due to chest wall deformity \par - pt will be discussed with DR Romero by DR Flanagan for any options for corrective surgery \par will also get ct chest to assess Pectus severity index \par \par # chronic productive cough ;\par will access with Ct chest non to r/o bronchiectasis\par \par >> follow up in pulm clinic after ct chest . \par \par  \par

## 2019-12-19 NOTE — ADDENDUM
[FreeTextEntry1] : I presented Mr Apple at the thoracic conference this morning December 19, 2019 to discuss evaluation and treatment of his pectus excavatum. We will order Cardiopulmonary exercise test and chest CT with contrast. His echo performed on 11/5/2019 whos mildly decreased global left ventricular systolic function, multiple left ventricular regional wall motion abnormalities, EF 49% trace mitral valve regurgitation. We will arrange a visit with Dr. Venegas

## 2019-12-19 NOTE — HISTORY OF PRESENT ILLNESS
[Difficulty Breathing During Exertion] : stable dyspnea on exertion [Feelings Of Weakness On Exertion] : stable exercise intolerance [Cough] : stable coughing [Wheezing] : denies wheezing [Regional Soft Tissue Swelling Both Lower Extremities] : denies lower extremity edema [Chest Pain Or Discomfort] : improved chest pain [Fever] : denies fever [FreeTextEntry1] : 55 y/o with history of  DM, HTN, HLD, enlarged prostate, chronic neck and back pain who presents for SOB and chest pain. SOB has been present for a few years off and on. However over the last year it has been worsening. It is worst on exertion, he can walk half a block or 6 stairs before he has to take a break. Cannot lay flat, has to sleep with additional pillows. Along with shortness of breath he has chest tightness when exerting himself. He has a cough which he reports is most noticeable after exertion, with frequent phlegm production. Phlegm is thick and white, produces roughly 3/4 of a cup throughout the day. Endorses weight loss, 10 pounds over 3-4 months - not intentional. Also reports that he has frequent upper respiratory infections. Denies fever, Had thoracic surgery for chest wall deformation. \par \par Of note he had PFTs done with his PMD prior to presentation in current office. FVC is 25% of predicted, FEV1 34% of predicted, FEV1/% of predicted.\par \par Reports that he has no pets and no known allergies and a non smoker .\par \par INTERVAL hx ; 12/17/19 ; pt is here for follow up after 10/19 ,he was asked to get complete PFTS with bronchodilators ,is not interpretable due to poor effort ,echo did not showed any PUL HTN with EF of 49%\par Today ,he still has dyspnea on exertion ,stable ,no chest pain ,off and on cough with whitish phlegm ,had a recent bronchitis .Also his last stress test was done 2 yrs ago ,which was negative .\par He has another PFTs from pain management 10\par

## 2019-12-20 ENCOUNTER — APPOINTMENT (OUTPATIENT)
Dept: UROLOGY | Facility: CLINIC | Age: 54
End: 2019-12-20
Payer: MEDICAID

## 2019-12-20 PROCEDURE — 99213 OFFICE O/P EST LOW 20 MIN: CPT

## 2019-12-20 NOTE — ASSESSMENT
[FreeTextEntry1] : CAMILLE DEUTSCH is a 54 year old male with a past medical history of elevated PSA. Presents to the office today for a follow up after he failed to follow up in 7/2019 for prostate biopsy and cancelled numerous appointments over the summer. Patient had a PSA of 10.5 ng/ml on 3/2019 with a negative urinalysis.New PSA is 18.1 ng/ml and 5.2 % Free PSA. No urinary issues, voiding well. Denies dysuria, gross hematuria, flank pain, fever, or chills. Denies family history of elevated PSA. Non smoker. IPSS 8- he is satisfied with his urination.\par \par Patient scheduled for prostate biopsy.. Patient instructed to avoid blood thinners 1 week before and after prostate. Patient made aware that he will experience blood in the urine, stool, or sperm for a couple of weeks. Patient aware of risks from prostate biopsy of bleeding and infections post biopsy and that if he develops fever or chills he will have to go to ER for IV antibiotics. Patient instructed to call office if he has difficulty urinating. Patient verbalizes understanding.\par  \par \par  \par Plan\par Elevated PSA \par Start: Ciprofloxacin HCl - 500 MG Oral Tablet; Take one tablet the night before biopsy,\par then take another tablet the`morning of the biopsy\par Culture - Urine; Specimen Source:Clean Catch; Status:Resulted - Requires Verification;  \par Done: 24Oct2019 12:00AM\par Uro F/U Prostate Biopsy Outpatient  Not required  Status: Complete - Scheduling  Done:\par 23Oct2019 12:48PM\par Urinalysis w/Reflex; Status:Complete;   Done: 24Oct2019 12:00AM\par \par -F/U for prostate biopsy\par -Cipro prescribed\par -UA and urine culture ordered, urine sample left today

## 2019-12-20 NOTE — PHYSICAL EXAM
[General Appearance - Well Developed] : well developed [General Appearance - Well Nourished] : well nourished [Normal Appearance] : normal appearance [General Appearance - In No Acute Distress] : no acute distress [Well Groomed] : well groomed [Abdomen Soft] : soft [Abdomen Tenderness] : non-tender [Costovertebral Angle Tenderness] : no ~M costovertebral angle tenderness [No Prostate Nodules] : no prostate nodules [Prostate Size ___ gm] : prostate size [unfilled] gm [Skin Color & Pigmentation] : normal skin color and pigmentation [Edema] : no peripheral edema [] : no respiratory distress [Respiration, Rhythm And Depth] : normal respiratory rhythm and effort [Exaggerated Use Of Accessory Muscles For Inspiration] : no accessory muscle use [Oriented To Time, Place, And Person] : oriented to person, place, and time [Affect] : the affect was normal [Mood] : the mood was normal [Not Anxious] : not anxious [Normal Station and Gait] : the gait and station were normal for the patient's age [No Focal Deficits] : no focal deficits [Femoral Lymph Nodes Enlarged Bilaterally] : femoral [Inguinal Lymph Nodes Enlarged Bilaterally] : inguinal

## 2019-12-20 NOTE — HISTORY OF PRESENT ILLNESS
[FreeTextEntry1] : CAMILLE DEUTSCH is a 54 year old male with a past medical history of elevated PSA. Presents to the office today for prostate biopsy results. Patient had a PSA of 10.5 ng/ml on 3/2019 with a negative urinalysis.New PSA is 18.1 ng/ml and 5.2 % Free PSA. No urinary issues, voiding well. Denies dysuria, gross hematuria, flank pain, fever, or chills. Denies family history of elevated PSA. Non smoker. IPSS 8- he is satisfied with his urination.\par \par one core came back as high grade pin\par

## 2020-01-08 ENCOUNTER — APPOINTMENT (OUTPATIENT)
Dept: CARDIOTHORACIC SURGERY | Facility: CLINIC | Age: 55
End: 2020-01-08
Payer: MEDICAID

## 2020-01-08 VITALS
OXYGEN SATURATION: 96 % | HEIGHT: 73 IN | HEART RATE: 83 BPM | DIASTOLIC BLOOD PRESSURE: 109 MMHG | SYSTOLIC BLOOD PRESSURE: 167 MMHG | TEMPERATURE: 97.9 F | RESPIRATION RATE: 12 BRPM

## 2020-01-08 PROCEDURE — 99204 OFFICE O/P NEW MOD 45 MIN: CPT

## 2020-01-08 NOTE — PHYSICAL EXAM
[General Appearance - Alert] : alert [General Appearance - In No Acute Distress] : in no acute distress [Sclera] : the sclera and conjunctiva were normal [PERRL With Normal Accommodation] : pupils were equal in size, round, and reactive to light [Extraocular Movements] : extraocular movements were intact [Outer Ear] : the ears and nose were normal in appearance [Oropharynx] : the oropharynx was normal [Neck Appearance] : the appearance of the neck was normal [Neck Cervical Mass (___cm)] : no neck mass was observed [Jugular Venous Distention Increased] : there was no jugular-venous distention [Thyroid Diffuse Enlargement] : the thyroid was not enlarged [Thyroid Nodule] : there were no palpable thyroid nodules [Auscultation Breath Sounds / Voice Sounds] : lungs were clear to auscultation bilaterally [Heart Rate And Rhythm] : heart rate was normal and rhythm regular [Heart Sounds] : normal S1 and S2 [Heart Sounds Gallop] : no gallops [Murmurs] : no murmurs [Heart Sounds Pericardial Friction Rub] : no pericardial rub [Pectus Carinatum] : a pectus carinatum was noted [Skin Color & Pigmentation] : normal skin color and pigmentation [Skin Turgor] : normal skin turgor [] : no rash [Deep Tendon Reflexes (DTR)] : deep tendon reflexes were 2+ and symmetric [Sensation] : the sensory exam was normal to light touch and pinprick [No Focal Deficits] : no focal deficits [Oriented To Time, Place, And Person] : oriented to person, place, and time [Impaired Insight] : insight and judgment were intact [Affect] : the affect was normal

## 2020-01-09 NOTE — DATA REVIEWED
[FreeTextEntry1] :  EXAM: 2-D ECHO (TTE) COMPLETE PROCEDURE DATE: 11/05/2019 \par Summary: \par  1. Mildly decreased global left ventricular systolic function. \par  2. Multiple left ventricular regional wall motion abnormalities exist. See \par wall motion findings. \par  3. LV Ejection Fraction by Jurado's Method with a biplane EF of 49 %. \par  4. Mild concentric left ventricular hypertrophy. \par  5. Mildly increased LV wall thickness. \par  6. Normal left ventricular internal cavity size. \par  7. The mean global longitudinal peak strain by speckle tracking is -4.5% \par which is markedly reduced. \par  8. Trace mitral valve regurgitation. \par  9. LA volume Index is 22.0 ml/mï¿½ ml/m2. \par \par PHYSICIAN INTERPRETATION: \par Left Ventricle: The left ventricular internal cavity size is normal. Left \par ventricular wall thickness is mildly increased. There is mild concentric \par left ventricular hypertrophy. Global LV systolic function was mildly \par decreased. Spectral Doppler shows normal pattern of LV diastolic filling. \par Normal LV filling pressures. The mean global longitudinal peak strain by \par speckle tracking is -4.5% which is markedly reduced. \par \par \par LV Wall Scoring: \par There is diffuse hypokinesis. \par \par Right Ventricle: Normal right ventricular size and function. \par Left Atrium: Normal left atrial size. LA volume Index is 22.0 ml/mï¿½ ml/m2. \par Right Atrium: Normal right atrial size. \par Pericardium: There is no evidence of pericardial effusion. \par Mitral Valve: Structurally normal mitral valve, with normal leaflet \par excursion. The mitral valve is normal in structure. Trace mitral valve \par regurgitation is seen. \par Tricuspid Valve: Structurally normal tricuspid valve, with normal leaflet \par excursion. The tricuspid valve is normal in structure. Mild tricuspid \par regurgitation is visualized. \par Aortic Valve: Normal trileaflet aortic valve with normal opening. The aortic \par valve is normal. No evidence of aortic valve regurgitation is seen. \par Pulmonic Valve: Structurally normal pulmonic valve, with normal leaflet \par excursion. The pulmonic valve is normal. Trace pulmonic valve regurgitation. \par Aorta: The aortic root and ascending aorta are structurally normal, with no \par evidence of dilitation. \par Pulmonary Artery: The main pulmonary artery is normal in size. \par Venous: The inferior vena cava was normal sized, with respiratory size \par variation greater than 50%. \par \par PFT: FEV1 2.35/63\par DLCO: 29.1

## 2020-01-09 NOTE — HISTORY OF PRESENT ILLNESS
[Diabetes Mellitus] : Diabetes Mellitus [Hypertension] : Hypertension [FreeTextEntry1] : 54 year M PMH DM, HTN, presents to our office today for a consultation for surgical evaluation of pectus excavatum  referred to our office by Dr. Gonzalez Flanagan. He had surgery for this issue when he was 11 and he developed cough and dyspnea. It was an urgent procedure and he recovered well after. He never had any other surgery after. He had a normal childhood after. PMH includes HTN, Diabetes, hypercholesterolia. His symptoms include SOB, b/l LE edema. He is unable to sleep, and the symptoms have become worse over the past 2 years. \par \par \par ECOG/WHO/Zubrod - 0 - Fully active, able to carry out all pre-disease performance without restrictions\par 1 - Restricted in physically strenuous activity but ambulatory and able to carry out work of a light or sedentary nature, e.g., light house work, office work\par 2 - Ambulatory and capable of all selfcare but unable to carry out any work activities. Up and about more than 50% of waking hours\par 3 - Capable of only limited selfcare, confined to bed or chair more than 50% of waking hours\par 4 - Completely disabled. Cannot carry on any selfcare. Totally confined to bed or chair\par \par His healthcare team is as follows:\par PMD:\par Cardio:\par Pulm: Gonzalez Flanagan\par EPS:\par Hem/onc:\par Renal:\par Endocrine:\par GI:\par Uro: Varinder Sunshine\par

## 2020-01-09 NOTE — ASSESSMENT
[FreeTextEntry1] : Mr. Arie Apple is a 54 year M PMH DM, HTN, PSXH chest wall surgery at 11yrs old, presents to our office today for a consultation for surgical evaluation of pectus excavatum referred to our office by Dr. Gonzalez Flanagan. He actually brings in some old handwritten records from that initial visit, but no operative report.  Interestingly, the notes that I can interpret discuss the patient's initial presentation with acute cyanosis, which is somewhat atypical for pectus.  In any event, the patient has clinic evidence for a recurrent and significant pectus excavatum.  He has a lower midline sternotomy from what I presume was a previous Ravitch repair.  \par \par He is pending a CT that suspect will have a fairly significant Bhumi index, but will also be useful for surgical planning.  He has PFTs that shows a fairly significant decrease in FEV1 and FVC.  He apparently had an echo recently that we will work to get the details for to assess compression on the heart and valvular competence.  \par \par Plan will be for surgical correction of the pectus. I am leaning towards offering repeat Ravitch, I think a Cory would require several bars and the tissue planes behind the sternum are unknown.  He will need cardiac clearance and prepare PAST. My usual routine for adult pectus repair is to involve the assistance of pediatric surgery, and we will discuss surgical planning with them once his CT chest is available for review.

## 2020-01-09 NOTE — CONSULT LETTER
[Dear  ___] : Dear  [unfilled], [Consult Letter:] : I had the pleasure of evaluating your patient, [unfilled]. [Consult Closing:] : Thank you very much for allowing me to participate in the care of this patient.  If you have any questions, please do not hesitate to contact me. [Sincerely,] : Sincerely, [FreeTextEntry2] : Gonzalez Flanagan [FreeTextEntry1] : This is a pleasant 54 year old male with a past medical history of diabetes and hypertension.  His past surgical history is significant for chest wall surgery at 11yrs old.  He presents to our office today for a consultation for surgical evaluation of pectus excavatum. He actually brings in some old handwritten records from that initial visit, but no operative report.  Interestingly, the notes that I can interpret discuss the patient's initial presentation with acute cyanosis, which is somewhat atypical for pectus.  In any event, the patient has clinic evidence for a recurrent and significant pectus excavatum.  He has a lower midline sternotomy from what I presume was a previous Ravitch repair.  \par \par He is pending a CT that suspect will have a fairly significant Bhumi index, but will also be useful for surgical planning.  He has PFTs that shows a fairly significant decrease in FEV1 and FVC.  He apparently had an echo recently that we will work to get the details for to assess compression on the heart and valvular competence.  \par \par Plan will be for surgical correction of the pectus. I am leaning towards offering repeat Ravitch, I think a Cory would require several bars and the tissue planes behind the sternum are unknown.  He will need cardiac clearance and prepare PAST. My usual routine for adult pectus repair is to involve the assistance of pediatric surgery, and we will discuss surgical planning with them once his CT chest is available for review. [FreeTextEntry3] : Harris Venegas M.D.\par Chief, Division of Thoracic Surgery\par Department of Cardiothoracic Surgery\par

## 2020-01-10 ENCOUNTER — FORM ENCOUNTER (OUTPATIENT)
Age: 55
End: 2020-01-10

## 2020-01-11 ENCOUNTER — OUTPATIENT (OUTPATIENT)
Dept: OUTPATIENT SERVICES | Facility: HOSPITAL | Age: 55
LOS: 1 days | Discharge: HOME | End: 2020-01-11
Payer: MEDICAID

## 2020-01-11 DIAGNOSIS — R91.1 SOLITARY PULMONARY NODULE: ICD-10-CM

## 2020-01-11 PROCEDURE — 71250 CT THORAX DX C-: CPT | Mod: 26

## 2020-03-16 ENCOUNTER — APPOINTMENT (OUTPATIENT)
Dept: NEUROLOGY | Facility: CLINIC | Age: 55
End: 2020-03-16

## 2020-04-06 ENCOUNTER — APPOINTMENT (OUTPATIENT)
Dept: OTOLARYNGOLOGY | Facility: CLINIC | Age: 55
End: 2020-04-06
Payer: MEDICAID

## 2020-04-06 PROCEDURE — 69210 REMOVE IMPACTED EAR WAX UNI: CPT

## 2020-04-06 PROCEDURE — 99203 OFFICE O/P NEW LOW 30 MIN: CPT | Mod: 25

## 2020-04-06 NOTE — PHYSICAL EXAM
[Midline] : trachea located in midline position [Normal] : no rashes [de-identified] : canal swollen with white discharge, bilateral. Left ear imnpacted wax cleaned.

## 2020-04-06 NOTE — HISTORY OF PRESENT ILLNESS
[de-identified] : Patient here today c/o b/l ear itching. Has been going on for 6 months.  Patient denies any otalgia. He has been using peroxide and cortisporin. He was recently given neomycin polymycin drops which helps but then the itching comes back. He has not used the drops in many months.  Some hearing loss in both ears. No pain. no drainage. \par Patient is diabetic.

## 2020-04-09 LAB
BACTERIA SPEC CULT: ABNORMAL
BACTERIA SPEC CULT: ABNORMAL

## 2020-04-13 ENCOUNTER — APPOINTMENT (OUTPATIENT)
Dept: OTOLARYNGOLOGY | Facility: CLINIC | Age: 55
End: 2020-04-13

## 2020-04-17 ENCOUNTER — APPOINTMENT (OUTPATIENT)
Dept: OTOLARYNGOLOGY | Facility: CLINIC | Age: 55
End: 2020-04-17
Payer: MEDICAID

## 2020-04-17 PROCEDURE — 99213 OFFICE O/P EST LOW 20 MIN: CPT | Mod: 25

## 2020-04-17 PROCEDURE — 69210 REMOVE IMPACTED EAR WAX UNI: CPT

## 2020-04-17 NOTE — HISTORY OF PRESENT ILLNESS
[de-identified] : Patient here today c/o b/l ear itching. Has been going on for 6 months.  Patient denies any otalgia. He has been using peroxide and cortisporin. He was recently given neomycin polymycin drops which helps but then the itching comes back. He has not used the drops in many months.  Some hearing loss in both ears. No pain. no drainage. \par Patient is diabetic. [FreeTextEntry1] : \par 4/17/2020: Patient here today following up on acute otitis externa and ear itching. Patient admits having ear itching for the past few days. it is on both sides now. He is using acetic acid. Drainage +\par no fever

## 2020-04-17 NOTE — PHYSICAL EXAM
[Normal] : tympanic membranes are normal in both ears [de-identified] : canal swollen with white discharge, bilateral. Left ear imnpacted wax cleaned.

## 2020-04-24 ENCOUNTER — APPOINTMENT (OUTPATIENT)
Dept: OTOLARYNGOLOGY | Facility: CLINIC | Age: 55
End: 2020-04-24
Payer: MEDICAID

## 2020-04-24 PROCEDURE — 99213 OFFICE O/P EST LOW 20 MIN: CPT | Mod: 25

## 2020-04-24 PROCEDURE — 69210 REMOVE IMPACTED EAR WAX UNI: CPT

## 2020-04-24 NOTE — HISTORY OF PRESENT ILLNESS
[de-identified] : Patient here today c/o b/l ear itching. Has been going on for 6 months.  Patient denies any otalgia. He has been using peroxide and cortisporin. He was recently given neomycin polymycin drops which helps but then the itching comes back. He has not used the drops in many months.  Some hearing loss in both ears. No pain. no drainage. \par Patient is diabetic.\par \par \par 4/17/2020: Patient here today following up on acute otitis externa and ear itching. Patient admits having ear itching for the past few days. it is on both sides now. He is using acetic acid. Drainage +\par no fever [FreeTextEntry1] : \par 4/24/2020: Patient following up on acute otitis externa. Patient continues to have bilateral itching. He has been using floxin drops. Otalgia when he does itch his ear. No change in hearing. Patient is diabetic.

## 2020-04-24 NOTE — PHYSICAL EXAM
[de-identified] : bilateral ears discharge cleaned and debrided. [Midline] : trachea located in midline position [Normal] : no rashes

## 2020-04-28 LAB — BACTERIA SPEC CULT: ABNORMAL

## 2020-04-29 LAB — BACTERIA SPEC CULT: ABNORMAL

## 2020-05-01 ENCOUNTER — APPOINTMENT (OUTPATIENT)
Dept: OTOLARYNGOLOGY | Facility: CLINIC | Age: 55
End: 2020-05-01
Payer: MEDICAID

## 2020-05-01 VITALS — WEIGHT: 230 LBS | BODY MASS INDEX: 30.35 KG/M2

## 2020-05-01 PROCEDURE — 99213 OFFICE O/P EST LOW 20 MIN: CPT

## 2020-05-01 NOTE — HISTORY OF PRESENT ILLNESS
[de-identified] : Patient here today c/o b/l ear itching. Has been going on for 6 months.  Patient denies any otalgia. He has been using peroxide and cortisporin. He was recently given neomycin polymycin drops which helps but then the itching comes back. He has not used the drops in many months.  Some hearing loss in both ears. No pain. no drainage. \par Patient is diabetic.\par \par \par 4/17/2020: Patient here today following up on acute otitis externa and ear itching. Patient admits having ear itching for the past few days. it is on both sides now. He is using acetic acid. Drainage +\par no fever [FreeTextEntry1] : \par 4/24/2020: Patient following up on acute otitis externa. Patient continues to have bilateral itching. He has been using floxin drops. Otalgia when he does itch his ear. No change in hearing. Patient is diabetic.\par \par 05/01/2020 Patient was here last week for acute otitis externa he is here today for 1 wk f/u. he feels better, but still complaining of itchy ears. He was put on abx drops and clotrimazole.

## 2020-05-01 NOTE — PHYSICAL EXAM
[Midline] : trachea located in midline position [Normal] : no rashes [de-identified] : bilateral ears discharge cleaned and debrided.

## 2020-05-01 NOTE — PHYSICAL EXAM
[Midline] : trachea located in midline position [Normal] : no rashes [de-identified] : bilateral ears discharge cleaned and debrided.

## 2020-05-01 NOTE — HISTORY OF PRESENT ILLNESS
[de-identified] : Patient here today c/o b/l ear itching. Has been going on for 6 months.  Patient denies any otalgia. He has been using peroxide and cortisporin. He was recently given neomycin polymycin drops which helps but then the itching comes back. He has not used the drops in many months.  Some hearing loss in both ears. No pain. no drainage. \par Patient is diabetic.\par \par \par 4/17/2020: Patient here today following up on acute otitis externa and ear itching. Patient admits having ear itching for the past few days. it is on both sides now. He is using acetic acid. Drainage +\par no fever [FreeTextEntry1] : \par 4/24/2020: Patient following up on acute otitis externa. Patient continues to have bilateral itching. He has been using floxin drops. Otalgia when he does itch his ear. No change in hearing. Patient is diabetic.\par \par 05/01/2020 Patient was here last week for acute otitis externa he is here today for 1 wk f/u. he feels better, but still complaining of itchy ears. He was put on abx drops and clotrimazole.

## 2020-05-06 ENCOUNTER — APPOINTMENT (OUTPATIENT)
Dept: OTOLARYNGOLOGY | Facility: CLINIC | Age: 55
End: 2020-05-06

## 2020-05-15 ENCOUNTER — TRANSCRIPTION ENCOUNTER (OUTPATIENT)
Age: 55
End: 2020-05-15

## 2020-05-19 ENCOUNTER — APPOINTMENT (OUTPATIENT)
Dept: UROLOGY | Facility: HOSPITAL | Age: 55
End: 2020-05-19

## 2020-06-03 ENCOUNTER — APPOINTMENT (OUTPATIENT)
Dept: NEUROLOGY | Facility: CLINIC | Age: 55
End: 2020-06-03

## 2020-06-19 ENCOUNTER — OUTPATIENT (OUTPATIENT)
Dept: OUTPATIENT SERVICES | Facility: HOSPITAL | Age: 55
LOS: 1 days | Discharge: HOME | End: 2020-06-19
Payer: MEDICAID

## 2020-06-19 PROCEDURE — 92002 INTRM OPH EXAM NEW PATIENT: CPT

## 2020-06-24 ENCOUNTER — APPOINTMENT (OUTPATIENT)
Dept: OTOLARYNGOLOGY | Facility: CLINIC | Age: 55
End: 2020-06-24

## 2020-06-24 DIAGNOSIS — H02.88A MEIBOMIAN GLAND DYSFUNCTION RIGHT EYE, UPPER AND LOWER EYELIDS: ICD-10-CM

## 2020-06-24 DIAGNOSIS — H00.012 HORDEOLUM EXTERNUM RIGHT LOWER EYELID: ICD-10-CM

## 2020-06-24 DIAGNOSIS — H04.123 DRY EYE SYNDROME OF BILATERAL LACRIMAL GLANDS: ICD-10-CM

## 2020-06-26 ENCOUNTER — APPOINTMENT (OUTPATIENT)
Dept: OTOLARYNGOLOGY | Facility: CLINIC | Age: 55
End: 2020-06-26
Payer: MEDICAID

## 2020-06-26 PROCEDURE — 69210 REMOVE IMPACTED EAR WAX UNI: CPT

## 2020-06-26 PROCEDURE — 99213 OFFICE O/P EST LOW 20 MIN: CPT | Mod: 25

## 2020-06-26 NOTE — HISTORY OF PRESENT ILLNESS
[de-identified] : Patient here today c/o b/l ear itching. Has been going on for 6 months.  Patient denies any otalgia. He has been using peroxide and cortisporin. He was recently given neomycin polymycin drops which helps but then the itching comes back. He has not used the drops in many months.  Some hearing loss in both ears. No pain. no drainage. \par Patient is diabetic.\par \par \par 4/17/2020: Patient here today following up on acute otitis externa and ear itching. Patient admits having ear itching for the past few days. it is on both sides now. He is using acetic acid. Drainage + no fever\par \par \par 4/24/2020: Patient following up on acute otitis externa. Patient continues to have bilateral itching. He has been using floxin drops. Otalgia when he does itch his ear. No change in hearing. Patient is diabetic.\par \par 05/01/2020 Patient was here last week for acute otitis externa he is here today for 1 wk f/u. he feels better, but still complaining of itchy ears. He was put on abx drops and clotrimazole.  [FreeTextEntry1] : \par 6/26/2020: Patient presents today following up on acute otitis externa. previously treated. Patient c/o itching in b/l ears, intermittent, mild. \par No change in hearing. No swelling. .

## 2020-06-26 NOTE — PHYSICAL EXAM
[de-identified] : left ear wax cleaned today. irritated ear canal. Culture taken. [Midline] : trachea located in midline position [Normal] : no rashes

## 2020-06-29 LAB — BACTERIA SPEC CULT: ABNORMAL

## 2020-07-08 ENCOUNTER — APPOINTMENT (OUTPATIENT)
Dept: OTOLARYNGOLOGY | Facility: CLINIC | Age: 55
End: 2020-07-08
Payer: MEDICAID

## 2020-07-08 PROCEDURE — 99213 OFFICE O/P EST LOW 20 MIN: CPT

## 2020-07-08 NOTE — PHYSICAL EXAM
[Midline] : trachea located in midline position [Normal] : no rashes [de-identified] :  edematous canals bilateral, no erythema, some white debris

## 2020-07-08 NOTE — HISTORY OF PRESENT ILLNESS
[de-identified] : Patient here today c/o b/l ear itching. Has been going on for 6 months.  Patient denies any otalgia. He has been using peroxide and cortisporin. He was recently given neomycin polymycin drops which helps but then the itching comes back. He has not used the drops in many months.  Some hearing loss in both ears. No pain. no drainage. \par Patient is diabetic.\par \par \par 4/17/2020: Patient here today following up on acute otitis externa and ear itching. Patient admits having ear itching for the past few days. it is on both sides now. He is using acetic acid. Drainage + no fever\par \par \par 4/24/2020: Patient following up on acute otitis externa. Patient continues to have bilateral itching. He has been using floxin drops. Otalgia when he does itch his ear. No change in hearing. Patient is diabetic.\par \par 05/01/2020 Patient was here last week for acute otitis externa he is here today for 1 wk f/u. he feels better, but still complaining of itchy ears. He was put on abx drops and clotrimazole. \par \par 6/26/2020: Patient presents today following up on acute otitis externa. previously treated. Patient c/o itching in b/l ears, intermittent, mild. \par No change in hearing. No swelling. .  [FreeTextEntry1] : \par 7/8/2020: Patient presents today following up on ear itching and acute otitis externa. Patient had culture taken at last visit. Continues to use drops/ Severe itching in b/l ears.  He admits he has been scratching them a lot. The drops helped but still not normal yet. \par Hearing is off and on.

## 2020-07-09 ENCOUNTER — OUTPATIENT (OUTPATIENT)
Dept: OUTPATIENT SERVICES | Facility: HOSPITAL | Age: 55
LOS: 1 days | Discharge: HOME | End: 2020-07-09
Payer: MEDICAID

## 2020-07-09 PROBLEM — Z00.00 ENCOUNTER FOR PREVENTIVE HEALTH EXAMINATION: Noted: 2020-07-09

## 2020-07-09 PROCEDURE — 92202 OPSCPY EXTND ON/MAC DRAW: CPT

## 2020-07-09 PROCEDURE — 92014 COMPRE OPH EXAM EST PT 1/>: CPT

## 2020-07-13 DIAGNOSIS — H02.88A MEIBOMIAN GLAND DYSFUNCTION RIGHT EYE, UPPER AND LOWER EYELIDS: ICD-10-CM

## 2020-07-13 DIAGNOSIS — H04.123 DRY EYE SYNDROME OF BILATERAL LACRIMAL GLANDS: ICD-10-CM

## 2020-07-13 DIAGNOSIS — H00.012 HORDEOLUM EXTERNUM RIGHT LOWER EYELID: ICD-10-CM

## 2020-07-13 DIAGNOSIS — E11.9 TYPE 2 DIABETES MELLITUS WITHOUT COMPLICATIONS: ICD-10-CM

## 2020-07-13 DIAGNOSIS — I10 ESSENTIAL (PRIMARY) HYPERTENSION: ICD-10-CM

## 2020-07-15 ENCOUNTER — APPOINTMENT (OUTPATIENT)
Dept: OTOLARYNGOLOGY | Facility: CLINIC | Age: 55
End: 2020-07-15
Payer: MEDICAID

## 2020-07-15 PROCEDURE — 99213 OFFICE O/P EST LOW 20 MIN: CPT

## 2020-07-15 NOTE — HISTORY OF PRESENT ILLNESS
[de-identified] : Patient here today c/o b/l ear itching. Has been going on for 6 months.  Patient denies any otalgia. He has been using peroxide and cortisporin. He was recently given neomycin polymycin drops which helps but then the itching comes back. He has not used the drops in many months.  Some hearing loss in both ears. No pain. no drainage. \par Patient is diabetic.\par \par \par 4/17/2020: Patient here today following up on acute otitis externa and ear itching. Patient admits having ear itching for the past few days. it is on both sides now. He is using acetic acid. Drainage + no fever\par \par \par 4/24/2020: Patient following up on acute otitis externa. Patient continues to have bilateral itching. He has been using floxin drops. Otalgia when he does itch his ear. No change in hearing. Patient is diabetic.\par \par 05/01/2020 Patient was here last week for acute otitis externa he is here today for 1 wk f/u. he feels better, but still complaining of itchy ears. He was put on abx drops and clotrimazole. \par \par 6/26/2020: Patient presents today following up on acute otitis externa. previously treated. Patient c/o itching in b/l ears, intermittent, mild. \par No change in hearing. No swelling. . \par \par \par 7/8/2020: Patient presents today following up on ear itching and acute otitis externa. Patient had culture taken at last visit. Continues to use drops/ Severe itching in b/l ears.  He admits he has been scratching them a lot. The drops helped but still not normal yet. \par Hearing is off and on.  [FreeTextEntry1] : \par 7/15/2020: Patient following up on acute otitis externa.

## 2020-08-19 ENCOUNTER — APPOINTMENT (OUTPATIENT)
Dept: OTOLARYNGOLOGY | Facility: CLINIC | Age: 55
End: 2020-08-19
Payer: MEDICAID

## 2020-08-19 PROCEDURE — 69210 REMOVE IMPACTED EAR WAX UNI: CPT

## 2020-08-19 PROCEDURE — 99214 OFFICE O/P EST MOD 30 MIN: CPT | Mod: 25

## 2020-08-19 NOTE — HISTORY OF PRESENT ILLNESS
[de-identified] : Patient here today c/o b/l ear itching. Has been going on for 6 months.  Patient denies any otalgia. He has been using peroxide and cortisporin. He was recently given neomycin polymycin drops which helps but then the itching comes back. He has not used the drops in many months.  Some hearing loss in both ears. No pain. no drainage. \par Patient is diabetic.\par \par \par 4/17/2020: Patient here today following up on acute otitis externa and ear itching. Patient admits having ear itching for the past few days. it is on both sides now. He is using acetic acid. Drainage + no fever\par \par \par 4/24/2020: Patient following up on acute otitis externa. Patient continues to have bilateral itching. He has been using floxin drops. Otalgia when he does itch his ear. No change in hearing. Patient is diabetic.\par \par 05/01/2020 Patient was here last week for acute otitis externa he is here today for 1 wk f/u. he feels better, but still complaining of itchy ears. He was put on abx drops and clotrimazole. \par \par 6/26/2020: Patient presents today following up on acute otitis externa. previously treated. Patient c/o itching in b/l ears, intermittent, mild. \par No change in hearing. No swelling. . \par \par \par 7/8/2020: Patient presents today following up on ear itching and acute otitis externa. Patient had culture taken at last visit. Continues to use drops/ Severe itching in b/l ears.  He admits he has been scratching them a lot. The drops helped but still not normal yet. \par Hearing is off and on. \par \par 7/15/2020: Patient following up on acute otitis externa.  [FreeTextEntry1] : \par 8/19/2020: Patient following up on acute otitis externa. Itching. Some bleeding in the right ear due to itching. no draiunage. no pain.\par \par He tried peroxide but it burned. No change in hearing.

## 2020-08-19 NOTE — PHYSICAL EXAM
[Midline] : trachea located in midline position [Normal] : orientation to person, place, and time: normal [de-identified] : bilateral impacted wax cleaned with curette. Irritated ear canals.

## 2020-08-19 NOTE — ASSESSMENT
[FreeTextEntry1] : rrecommended dry ears precautions.\par \par Use cortisporin for 3 days then flucinolone. \par \par RTC in 3weeks

## 2020-09-09 ENCOUNTER — APPOINTMENT (OUTPATIENT)
Dept: OTOLARYNGOLOGY | Facility: CLINIC | Age: 55
End: 2020-09-09
Payer: MEDICAID

## 2020-09-09 DIAGNOSIS — H91.90 UNSPECIFIED HEARING LOSS, UNSPECIFIED EAR: ICD-10-CM

## 2020-09-09 PROCEDURE — 99213 OFFICE O/P EST LOW 20 MIN: CPT | Mod: 25

## 2020-09-09 PROCEDURE — 92557 COMPREHENSIVE HEARING TEST: CPT

## 2020-09-09 PROCEDURE — 92550 TYMPANOMETRY & REFLEX THRESH: CPT

## 2020-09-09 NOTE — HISTORY OF PRESENT ILLNESS
[FreeTextEntry1] : \par 9/9/2020: Patient following up on acute otitis externa. Patents state ear still feel itchy on both ears, bleeds liitle. still uses the medication when it hurts.\par \par His hearing has also changed on both sides.  [de-identified] : Patient here today c/o b/l ear itching. Has been going on for 6 months.  Patient denies any otalgia. He has been using peroxide and cortisporin. He was recently given neomycin polymycin drops which helps but then the itching comes back. He has not used the drops in many months.  Some hearing loss in both ears. No pain. no drainage. \par Patient is diabetic.\par \par \par 4/17/2020: Patient here today following up on acute otitis externa and ear itching. Patient admits having ear itching for the past few days. it is on both sides now. He is using acetic acid. Drainage + no fever\par \par \par 4/24/2020: Patient following up on acute otitis externa. Patient continues to have bilateral itching. He has been using floxin drops. Otalgia when he does itch his ear. No change in hearing. Patient is diabetic.\par \par 05/01/2020 Patient was here last week for acute otitis externa he is here today for 1 wk f/u. he feels better, but still complaining of itchy ears. He was put on abx drops and clotrimazole. \par \par 6/26/2020: Patient presents today following up on acute otitis externa. previously treated. Patient c/o itching in b/l ears, intermittent, mild. \par No change in hearing. No swelling. . \par \par \par 7/8/2020: Patient presents today following up on ear itching and acute otitis externa. Patient had culture taken at last visit. Continues to use drops/ Severe itching in b/l ears.  He admits he has been scratching them a lot. The drops helped but still not normal yet. \par Hearing is off and on. \par \par 7/15/2020: Patient following up on acute otitis externa. \par \par \par 8/19/2020: Patient following up on acute otitis externa. Itching. Some bleeding in the right ear due to itching. no draiunage. no pain.\par \par He tried peroxide but it burned. No change in hearing.

## 2020-09-09 NOTE — ASSESSMENT
[FreeTextEntry1] : Audiogram reviewed and discussed. \par \par dry ears precautions. \par \par continue steroid drops prn.

## 2020-09-21 ENCOUNTER — APPOINTMENT (OUTPATIENT)
Dept: UROLOGY | Facility: CLINIC | Age: 55
End: 2020-09-21
Payer: MEDICAID

## 2020-09-21 VITALS — TEMPERATURE: 97.8 F | WEIGHT: 241 LBS | HEIGHT: 73 IN | BODY MASS INDEX: 31.94 KG/M2

## 2020-09-21 PROCEDURE — 99213 OFFICE O/P EST LOW 20 MIN: CPT

## 2020-09-21 RX ORDER — CIPROFLOXACIN HYDROCHLORIDE 500 MG/1
500 TABLET, FILM COATED ORAL
Qty: 14 | Refills: 0 | Status: DISCONTINUED | COMMUNITY
Start: 2020-04-24 | End: 2020-09-21

## 2020-09-21 RX ORDER — OFLOXACIN OTIC 3 MG/ML
0.3 SOLUTION AURICULAR (OTIC)
Qty: 1 | Refills: 0 | Status: DISCONTINUED | COMMUNITY
Start: 2020-06-30 | End: 2020-09-21

## 2020-09-21 RX ORDER — CIPROFLOXACIN HYDROCHLORIDE 500 MG/1
500 TABLET, FILM COATED ORAL
Qty: 2 | Refills: 0 | Status: DISCONTINUED | COMMUNITY
Start: 2019-12-20 | End: 2020-09-21

## 2020-09-21 RX ORDER — OFLOXACIN OTIC 3 MG/ML
0.3 SOLUTION AURICULAR (OTIC)
Qty: 1 | Refills: 0 | Status: DISCONTINUED | COMMUNITY
Start: 2020-07-08 | End: 2020-09-21

## 2020-09-21 RX ORDER — CIPROFLOXACIN HYDROCHLORIDE 500 MG/1
500 TABLET, FILM COATED ORAL
Qty: 2 | Refills: 0 | Status: DISCONTINUED | COMMUNITY
Start: 2019-10-23 | End: 2020-09-21

## 2020-09-21 NOTE — HISTORY OF PRESENT ILLNESS
[FreeTextEntry1] : CAMILLE DEUTSCH is a 55 year old male with a past medical history of elevated PSA. Presents to the office today for a follow up after he failed to follow up in 7/2019 for prostate biopsy and cancelled numerous appointments over the summer. \par \par Patient had a PSA of 10.5 ng/ml on 3/2019 \par PSA is 18.1 ng/ml and 5.2 % Free PSA. No urinary issues, voiding well. Denies dysuria, gross hematuria, flank pain, fever, or chills. Denies family history of elevated PSA. Non smoker. IPSS 8- he is satisfied with his urination.\par August 2020 -- psa 15.5\par \par prostate biopsy on March 2020 showed tiny focus of high grade PIN on left lateral base\par \par mild straining to urinate in the morning\par

## 2020-09-21 NOTE — PHYSICAL EXAM
[General Appearance - Well Developed] : well developed [General Appearance - Well Nourished] : well nourished [Normal Appearance] : normal appearance [Well Groomed] : well groomed [General Appearance - In No Acute Distress] : no acute distress [Abdomen Soft] : soft [Abdomen Tenderness] : non-tender [Costovertebral Angle Tenderness] : no ~M costovertebral angle tenderness [No Prostate Nodules] : no prostate nodules [Prostate Size ___ gm] : prostate size [unfilled] gm [Skin Color & Pigmentation] : normal skin color and pigmentation [Edema] : no peripheral edema [] : no respiratory distress [Respiration, Rhythm And Depth] : normal respiratory rhythm and effort [Exaggerated Use Of Accessory Muscles For Inspiration] : no accessory muscle use [Oriented To Time, Place, And Person] : oriented to person, place, and time [Affect] : the affect was normal [Mood] : the mood was normal [Not Anxious] : not anxious [Normal Station and Gait] : the gait and station were normal for the patient's age [No Focal Deficits] : no focal deficits [Femoral Lymph Nodes Enlarged Bilaterally] : femoral [Inguinal Lymph Nodes Enlarged Bilaterally] : inguinal

## 2020-11-02 ENCOUNTER — APPOINTMENT (OUTPATIENT)
Dept: UROLOGY | Facility: CLINIC | Age: 55
End: 2020-11-02

## 2020-11-10 ENCOUNTER — RESULT REVIEW (OUTPATIENT)
Age: 55
End: 2020-11-10

## 2020-11-10 ENCOUNTER — OUTPATIENT (OUTPATIENT)
Dept: OUTPATIENT SERVICES | Facility: HOSPITAL | Age: 55
LOS: 1 days | Discharge: HOME | End: 2020-11-10
Payer: MEDICARE

## 2020-11-10 DIAGNOSIS — R97.20 ELEVATED PROSTATE SPECIFIC ANTIGEN [PSA]: ICD-10-CM

## 2020-11-10 PROCEDURE — 72197 MRI PELVIS W/O & W/DYE: CPT | Mod: 26

## 2020-11-18 ENCOUNTER — APPOINTMENT (OUTPATIENT)
Dept: UROLOGY | Facility: CLINIC | Age: 55
End: 2020-11-18

## 2020-11-29 ENCOUNTER — APPOINTMENT (OUTPATIENT)
Dept: UROLOGY | Facility: CLINIC | Age: 55
End: 2020-11-29
Payer: MEDICAID

## 2020-11-29 PROCEDURE — 99442: CPT

## 2020-11-29 NOTE — HISTORY OF PRESENT ILLNESS
[Home] : at home, [unfilled] , at the time of the visit. [Verbal consent obtained from patient] : the patient, [unfilled] [FreeTextEntry1] : Telephonic visit -- [facility] FOR FOLLOW UP APPOINTMENT\par \par The patient-doctor relationship has been established in an audio HIPAA compliant communication using telemedicine software. The patient's identity has been confirmed. The patient was previously emailed a copy of the consent. They have had a chance to review and has now given verbal consent and has requested care to be assessed and treated through telephone and understands there maybe limitations in this process and they may need further follow up care in the office and or hospital settings.\par \par The patient denies fevers, chills, nausea and or vomiting and no unexplained weight loss.\par \par All pertinent parts of the patient PFSH (past medical, family and social histories), laboratory, radiological studies and physician notes were reviewed prior to starting the visit. Questionnaire results were discussed with patient.\par \par ==================================================================================================== \par \par CAMILLE DEUTSCH is a 55 year old male with a past medical history of elevated PSA. Presents to the office today for a follow up after he failed to follow up in 7/2019 for prostate biopsy and cancelled numerous appointments over the summer. \par \par Patient had a PSA of 10.5 ng/ml on 3/2019 \par PSA is 18.1 ng/ml and 5.2 % Free PSA. No urinary issues, voiding well. Denies dysuria, gross hematuria, flank pain, fever, or chills. Denies family history of elevated PSA. Non smoker. 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

## 2020-11-29 NOTE — ASSESSMENT
[FreeTextEntry1] : \par CAMILLE DEUTSCH is a 55 year old male with a past medical history of elevated PSA. Presents to the office today for a follow up after he failed to follow up in 7/2019 for prostate biopsy and cancelled numerous appointments over the summer. \par \par Patient had a PSA of 10.5 ng/ml on 3/2019 \par PSA is 18.1 ng/ml and 5.2 % Free PSA. No urinary issues, voiding well. Denies dysuria, gross hematuria, flank pain, fever, or chills. Denies family history of elevated PSA. Non smoker. 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

## 2020-12-11 ENCOUNTER — APPOINTMENT (OUTPATIENT)
Dept: PULMONOLOGY | Facility: CLINIC | Age: 55
End: 2020-12-11
Payer: MEDICARE

## 2020-12-11 ENCOUNTER — OUTPATIENT (OUTPATIENT)
Dept: OUTPATIENT SERVICES | Facility: HOSPITAL | Age: 55
LOS: 1 days | Discharge: HOME | End: 2020-12-11

## 2020-12-11 VITALS
WEIGHT: 250 LBS | SYSTOLIC BLOOD PRESSURE: 149 MMHG | OXYGEN SATURATION: 97 % | HEART RATE: 84 BPM | HEIGHT: 73 IN | BODY MASS INDEX: 33.13 KG/M2 | TEMPERATURE: 97.7 F | DIASTOLIC BLOOD PRESSURE: 89 MMHG

## 2020-12-11 DIAGNOSIS — Q67.6 PECTUS EXCAVATUM: ICD-10-CM

## 2020-12-11 DIAGNOSIS — E66.9 OBESITY, UNSPECIFIED: ICD-10-CM

## 2020-12-11 DIAGNOSIS — R06.00 DYSPNEA, UNSPECIFIED: ICD-10-CM

## 2020-12-11 PROCEDURE — 99213 OFFICE O/P EST LOW 20 MIN: CPT | Mod: GC

## 2020-12-11 NOTE — ASSESSMENT
[FreeTextEntry1] : This is a 55 year old male with a significant history of chronic shortness of breath. He was last evaluated by Dr. Flanagan, and at that time PFT with bronchodilators was performed but not able to be interpreted due to poor effort. TTE was also ordered at that time which showed LVEF of 49% with no evidence of Pulmonary HTN. 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11; Severe pectus excavatum \par - PFTs from PMD shows Restrictive pattern ( FVC 25% of predictive; FEV1 34% of predictive; FEV1/%)\par - Repeat PFTs with complete study and bronchodilators; are not interpretable due to poor effort \par - TTE showed no evidence of Pulmonary HTN with an LVEF 49%\par - CT Chest was unremarkable except for 3.4 cm pulmonary artery dilation\par - He was evaluated by CT surgery and was offered corrective surgery, however, he does not want to undergo the offered procedure\par - He has relief with a rescue Albuterol inhaler\par - Advised patient to lose weight as below mentioned and to follow up with Dr. Venegas if his symptoms persist or worsen and he changes his mind regarding the surgery\par \par Morbid Obesity\par - He is also overweight with significant central obesity; He is being referred to a dietician\par - Discussed a detailed plan to change diet to a plant-based diet\par - Discussed plan to change diet slowly to promote better compliance\par \par Hypertension\par - BP elevated in the office\par - Advised to continue to check BP at home and call his treating physician for his BP for medication adjustment if it remains elevated \par \par

## 2020-12-11 NOTE — PHYSICAL EXAM
[No Acute Distress] : no acute distress [Normal Appearance] : normal appearance [No Neck Mass] : no neck mass [Normal Rate/Rhythm] : normal rate/rhythm [Normal S1, S2] : normal s1, s2 [No Murmurs] : no murmurs [No Resp Distress] : no resp distress [No Acc Muscle Use] : no acc muscle use [Normal Rhythm and Effort] : normal rhythm and effort [Clear to Auscultation Bilaterally] : clear to auscultation bilaterally [Not Tender] : not tender [No Masses] : no masses [No HSM] : no hsm [No Hernias] : no hernias [Normal Bowel Sounds] : normal bowel sounds [Normal Gait] : normal gait [No Clubbing] : no clubbing [No Cyanosis] : no cyanosis [No Edema] : no edema [FROM] : FROM [Normal Color/ Pigmentation] : normal color/ pigmentation [No Focal Deficits] : no focal deficits [Oriented x3] : oriented x3 [Normal Affect] : normal affect [TextBox_2] : Obesity [TextBox_80] : Severe pectus excavatum [TextBox_89] : Central obesity

## 2020-12-11 NOTE — REVIEW OF SYSTEMS
[Chest Tightness] : chest tightness [Dyspnea] : dyspnea [SOB on Exertion] : sob on exertion [Negative] : Gastrointestinal [Cough] : no cough [Frequent URIs] : no frequent URIs [Sputum] : no sputum [Pleuritic Pain] : no pleuritic pain [Wheezing] : no wheezing [Arthralgias] : no arthralgias [Back Pain] : no back pain [Trauma/ Injury] : no trauma/ injury [Chronic Pain] : no chronic pain [TextBox_91] : Chest deformity unchanged

## 2020-12-11 NOTE — HISTORY OF PRESENT ILLNESS
[TextBox_4] : This is a 55 year old male with a significant history of chronic shortness of breath. He was last evaluated by Dr. Flanagan, and at that time PFT with bronchodilators was performed but not able to be interpreted due to poor effort. TTE was also ordered at that time which showed LVEF of 49% with no evidence of Pulmonary HTN. Prior to that PFT study, he had a study done with his PMD showing FVC is 25% of predicted, FEV1 34% of predicted, FEV1/% of predicted.\par \par Today, the patient reports feeling better since his last visit. He is still experiencing SOB that is worsened on exertion, however, but is still able to perform his ADLs.

## 2020-12-15 ENCOUNTER — OUTPATIENT (OUTPATIENT)
Dept: OUTPATIENT SERVICES | Facility: HOSPITAL | Age: 55
LOS: 1 days | Discharge: HOME | End: 2020-12-15

## 2020-12-15 ENCOUNTER — APPOINTMENT (OUTPATIENT)
Dept: NUTRITION | Facility: CLINIC | Age: 55
End: 2020-12-15

## 2020-12-21 PROBLEM — Z87.440 HISTORY OF URINARY TRACT INFECTION: Status: RESOLVED | Noted: 2019-10-28 | Resolved: 2020-12-21

## 2020-12-30 ENCOUNTER — APPOINTMENT (OUTPATIENT)
Dept: UROLOGY | Facility: CLINIC | Age: 55
End: 2020-12-30

## 2021-01-05 ENCOUNTER — APPOINTMENT (OUTPATIENT)
Dept: NUTRITION | Facility: CLINIC | Age: 56
End: 2021-01-05

## 2021-01-06 ENCOUNTER — APPOINTMENT (OUTPATIENT)
Dept: OPHTHALMOLOGY | Facility: CLINIC | Age: 56
End: 2021-01-06

## 2021-01-21 ENCOUNTER — APPOINTMENT (OUTPATIENT)
Dept: NUTRITION | Facility: CLINIC | Age: 56
End: 2021-01-21

## 2021-01-22 ENCOUNTER — APPOINTMENT (OUTPATIENT)
Dept: UROLOGY | Facility: CLINIC | Age: 56
End: 2021-01-22

## 2021-02-10 ENCOUNTER — APPOINTMENT (OUTPATIENT)
Dept: ENDOCRINOLOGY | Facility: CLINIC | Age: 56
End: 2021-02-10

## 2021-02-16 ENCOUNTER — APPOINTMENT (OUTPATIENT)
Dept: OTOLARYNGOLOGY | Facility: CLINIC | Age: 56
End: 2021-02-16
Payer: MEDICARE

## 2021-02-16 DIAGNOSIS — H61.22 IMPACTED CERUMEN, LEFT EAR: ICD-10-CM

## 2021-02-16 PROCEDURE — 99213 OFFICE O/P EST LOW 20 MIN: CPT | Mod: 25

## 2021-02-16 PROCEDURE — G0268 REMOVAL OF IMPACTED WAX MD: CPT

## 2021-02-16 NOTE — ASSESSMENT
[FreeTextEntry1] : -diabetic.\par -wax cleaned.\par I counseled the patient regarding avoiding overusing qtips and explained the risks of infection, eardrum perforation, ear canal irritation, and injury, impacted wax with conductive hearing loss...\par \par -recurrent otitis externa, culture taken. \par prescribed acetic acid and floxin and discussed how to use them .\par I discussed with the patient the need to control his infection us soon as we can to avoid progression into a malignant otitis externa and an osteomyelitis of the temporal bone, since his diabetes is a significant risk factor.\par I recommended in addition to his antibiotic treatment, dry ears precautions, and strict control of his blood sugar levels, in addition to avoiding  qtips.\par

## 2021-02-16 NOTE — HISTORY OF PRESENT ILLNESS
[de-identified] : Patient here today c/o b/l ear itching. Has been going on for 6 months.  Patient denies any otalgia. HX diabetes.  He has been using peroxide and cortisporin. He was recently given neomycin polymycin drops which helps but then the itching comes back. He has not used the drops in many months.  Occasional tinnitus. Some hearing loss in both ears. No otalgia No ottorhea. \par \par \par 4/17/2020: Patient here today following up on acute otitis externa and ear itching. Patient admits having ear itching for the past few days. it is on both sides now. He is using acetic acid. Drainage + no fever\par \par \par 4/24/2020: Patient following up on acute otitis externa. Patient continues to have bilateral itching. He has been using floxin drops. Otalgia when he does itch his ear. No change in hearing. Patient is diabetic.\par \par 05/01/2020 Patient was here last week for acute otitis externa he is here today for 1 wk f/u. he feels better, but still complaining of itchy ears. He was put on abx drops and clotrimazole. \par \par 6/26/2020: Patient presents today following up on acute otitis externa. previously treated. Patient c/o itching in b/l ears, intermittent, mild. \par No change in hearing. No swelling. . \par \par \par 7/8/2020: Patient presents today following up on ear itching and acute otitis externa. Patient had culture taken at last visit. Continues to use drops/ Severe itching in b/l ears.  He admits he has been scratching them a lot. The drops helped but still not normal yet. \par Hearing is off and on. \par \par 7/15/2020: Patient following up on acute otitis externa. \par \par \par 8/19/2020: Patient following up on acute otitis externa. Itching. Some bleeding in the right ear due to itching. no draiunage. no pain.\par \par He tried peroxide but it burned. No change in hearing. \par \par \par 9/9/2020: Patient following up on acute otitis externa. Patents state ear still feel itchy on both ears, bleeds liitle. still uses the medication when it hurts.\par \par His hearing has also changed on both sides.  [FreeTextEntry1] : \par 2/16/21: Patient presents today following up on acute otitis externa and ear itching. Patient admits ear itching stopped for about 1 month but later returned. No otalgia associated.

## 2021-02-16 NOTE — PHYSICAL EXAM
[Midline] : trachea located in midline position [Normal] : no rashes [de-identified] : left ear canal debris cleaned with curette and suction. right wax cleaned. [de-identified] : TM appears nornal bilaterally.

## 2021-02-19 LAB — BACTERIA SPEC CULT: ABNORMAL

## 2021-02-26 ENCOUNTER — APPOINTMENT (OUTPATIENT)
Dept: OTOLARYNGOLOGY | Facility: CLINIC | Age: 56
End: 2021-02-26
Payer: MEDICARE

## 2021-02-26 PROCEDURE — 99213 OFFICE O/P EST LOW 20 MIN: CPT | Mod: 25

## 2021-02-26 NOTE — PHYSICAL EXAM
[Midline] : trachea located in midline position [Normal] : no rashes [de-identified] : net improvement in bilateral ears exam.  [de-identified] : TM appears nornal bilaterally.

## 2021-02-26 NOTE — HISTORY OF PRESENT ILLNESS
[de-identified] : Patient here today c/o b/l ear itching. Has been going on for 6 months.  Patient denies any otalgia. HX diabetes.  He has been using peroxide and cortisporin. He was recently given neomycin polymycin drops which helps but then the itching comes back. He has not used the drops in many months.  Occasional tinnitus. Some hearing loss in both ears. No otalgia No ottorhea. \par \par \par 4/17/2020: Patient here today following up on acute otitis externa and ear itching. Patient admits having ear itching for the past few days. it is on both sides now. He is using acetic acid. Drainage + no fever\par \par \par 4/24/2020: Patient following up on acute otitis externa. Patient continues to have bilateral itching. He has been using floxin drops. Otalgia when he does itch his ear. No change in hearing. Patient is diabetic.\par \par 05/01/2020 Patient was here last week for acute otitis externa he is here today for 1 wk f/u. he feels better, but still complaining of itchy ears. He was put on abx drops and clotrimazole. \par \par 6/26/2020: Patient presents today following up on acute otitis externa. previously treated. Patient c/o itching in b/l ears, intermittent, mild. \par No change in hearing. No swelling. . \par \par \par 7/8/2020: Patient presents today following up on ear itching and acute otitis externa. Patient had culture taken at last visit. Continues to use drops/ Severe itching in b/l ears.  He admits he has been scratching them a lot. The drops helped but still not normal yet. \par Hearing is off and on. \par \par 7/15/2020: Patient following up on acute otitis externa. \par \par \par 8/19/2020: Patient following up on acute otitis externa. Itching. Some bleeding in the right ear due to itching. no draiunage. no pain.\par \par He tried peroxide but it burned. No change in hearing. \par \par \par 9/9/2020: Patient following up on acute otitis externa. Patents state ear still feel itchy on both ears, bleeds liitle. still uses the medication when it hurts.\par \par His hearing has also changed on both sides. \par \par \par 2/16/21: Patient presents today following up on acute otitis externa and ear itching. Patient admits ear itching stopped for about 1 month but later returned. No otalgia associated.  [FreeTextEntry1] : \par 2/26/21: Patient following up on acute otitis externa. Patient admits improvement since last visit. Less itching. Cultures taken at last visit.

## 2021-02-26 NOTE — ASSESSMENT
[FreeTextEntry1] : I personally reviewed, interpreted and discussed patient's culture results.\par \par continue floxin and acetic acid for 3 more days then RTC in 2 weeks. \par \par I discussed with the patient the need to control his infection us soon as we can to avoid progression into a malignant otitis externa and an osteomyelitis of the temporal bone, since his diabetes is a significant risk factor.\par I recommended in addition to his antibiotic treatment, dry ears precautions, and strict control of his blood sugar levels, in addition to avoiding  qtips.\par

## 2021-03-10 ENCOUNTER — OUTPATIENT (OUTPATIENT)
Dept: OUTPATIENT SERVICES | Facility: HOSPITAL | Age: 56
LOS: 1 days | Discharge: HOME | End: 2021-03-10
Payer: MEDICARE

## 2021-03-10 DIAGNOSIS — Z86.16 PERSONAL HISTORY OF COVID-19: ICD-10-CM

## 2021-03-10 DIAGNOSIS — R05 COUGH: ICD-10-CM

## 2021-03-10 PROCEDURE — 71046 X-RAY EXAM CHEST 2 VIEWS: CPT | Mod: 26

## 2021-03-12 ENCOUNTER — APPOINTMENT (OUTPATIENT)
Dept: OTOLARYNGOLOGY | Facility: CLINIC | Age: 56
End: 2021-03-12
Payer: MEDICARE

## 2021-03-12 PROCEDURE — 99212 OFFICE O/P EST SF 10 MIN: CPT

## 2021-03-12 PROCEDURE — 99072 ADDL SUPL MATRL&STAF TM PHE: CPT

## 2021-03-12 NOTE — ASSESSMENT
[FreeTextEntry1] : resolved otitis externa in a diabetic patient.\par \par stop drops at this time.\par \par explained risk of recurrent otitis externa due to his diabetes and recommended strict dry ears precautions.\par \par RTc in 4M.

## 2021-03-12 NOTE — HISTORY OF PRESENT ILLNESS
[de-identified] : Patient here today c/o b/l ear itching. Has been going on for 6 months.  Patient denies any otalgia. HX diabetes.  He has been using peroxide and cortisporin. He was recently given neomycin polymycin drops which helps but then the itching comes back. He has not used the drops in many months.  Occasional tinnitus. Some hearing loss in both ears. No otalgia No ottorhea. \par \par \par 4/17/2020: Patient here today following up on acute otitis externa and ear itching. Patient admits having ear itching for the past few days. it is on both sides now. He is using acetic acid. Drainage + no fever\par \par \par 4/24/2020: Patient following up on acute otitis externa. Patient continues to have bilateral itching. He has been using floxin drops. Otalgia when he does itch his ear. No change in hearing. Patient is diabetic.\par \par 05/01/2020 Patient was here last week for acute otitis externa he is here today for 1 wk f/u. he feels better, but still complaining of itchy ears. He was put on abx drops and clotrimazole. \par \par 6/26/2020: Patient presents today following up on acute otitis externa. previously treated. Patient c/o itching in b/l ears, intermittent, mild. \par No change in hearing. No swelling. . \par \par \par 7/8/2020: Patient presents today following up on ear itching and acute otitis externa. Patient had culture taken at last visit. Continues to use drops/ Severe itching in b/l ears.  He admits he has been scratching them a lot. The drops helped but still not normal yet. \par Hearing is off and on. \par \par 7/15/2020: Patient following up on acute otitis externa. \par \par \par 8/19/2020: Patient following up on acute otitis externa. Itching. Some bleeding in the right ear due to itching. no draiunage. no pain.\par \par He tried peroxide but it burned. No change in hearing. \par \par \par 9/9/2020: Patient following up on acute otitis externa. Patents state ear still feel itchy on both ears, bleeds liitle. still uses the medication when it hurts.\par \par His hearing has also changed on both sides. \par \par \par 2/16/21: Patient presents today following up on acute otitis externa and ear itching. Patient admits ear itching stopped for about 1 month but later returned. No otalgia associated. \par \par \par 2/26/21: Patient following up on acute otitis externa. Patient admits improvement since last visit. Less itching. Cultures taken at last visit.  [FreeTextEntry1] : \par 2/26/21: Patient following up on acute otitis externa. Having off and on swelling  of the ear and itchiness. Was taking ciprofloxacin (for prostatitis) and swelling resoled.

## 2021-04-26 ENCOUNTER — APPOINTMENT (OUTPATIENT)
Dept: UROLOGY | Facility: CLINIC | Age: 56
End: 2021-04-26
Payer: MEDICARE

## 2021-04-26 DIAGNOSIS — N42.31 PROSTATIC INTRAEPITHELIAL NEOPLASIA: ICD-10-CM

## 2021-04-26 PROCEDURE — 55700: CPT

## 2021-04-26 PROCEDURE — 99213 OFFICE O/P EST LOW 20 MIN: CPT | Mod: 25

## 2021-04-26 PROCEDURE — 99072 ADDL SUPL MATRL&STAF TM PHE: CPT

## 2021-04-26 RX ORDER — CIPROFLOXACIN HYDROCHLORIDE 500 MG/1
500 TABLET, FILM COATED ORAL
Qty: 10 | Refills: 0 | Status: DISCONTINUED | COMMUNITY
Start: 2021-03-09 | End: 2021-04-26

## 2021-04-26 RX ORDER — CIPROFLOXACIN HYDROCHLORIDE 500 MG/1
500 TABLET, FILM COATED ORAL
Qty: 2 | Refills: 0 | Status: DISCONTINUED | COMMUNITY
Start: 2020-11-29 | End: 2021-04-26

## 2021-04-26 NOTE — HISTORY OF PRESENT ILLNESS
[FreeTextEntry1] : CAMILLE DEUTSCH is a 56 year old male with a past medical history of elevated PSA. Presents to the office today for a follow up after he failed to follow up in 7/2019 for prostate biopsy and cancelled numerous appointments over the summer. \par \par Patient had a PSA of 10.5 ng/ml on 3/2019 \par PSA is 18.1 ng/ml and 5.2 % Free PSA. No urinary issues, voiding well. Denies dysuria, gross hematuria, flank pain, fever, or chills. Denies family history of elevated PSA. Non smoker. 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 \par \par we scheduled prostate biopsy in [facility] 2020 but developed covid so wasn’t able to make it -- for bipsy today \par

## 2021-04-26 NOTE — ASSESSMENT
[FreeTextEntry1] : CAMILLE DEUTSCH is a 56 year old male with a past medical history of elevated PSA. Presents to the office today for a follow up after he failed to follow up in 7/2019 for prostate biopsy and cancelled numerous appointments over the summer. \par \par Patient had a PSA of 10.5 ng/ml on 3/2019 \par PSA is 18.1 ng/ml and 5.2 % Free PSA. No urinary issues, voiding well. Denies dysuria, gross hematuria, flank pain, fever, or chills. Denies family history of elevated PSA. Non smoker. 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 \par \par we scheduled prostate biopsy in [facility] 2020 but developed covid so wasn’t able to make it - for bipsy today -- he took abx and no blood thinners in the last week\par

## 2021-05-11 LAB — CORE LAB BIOPSY: NORMAL

## 2021-05-17 ENCOUNTER — APPOINTMENT (OUTPATIENT)
Dept: OTOLARYNGOLOGY | Facility: CLINIC | Age: 56
End: 2021-05-17

## 2021-06-09 ENCOUNTER — APPOINTMENT (OUTPATIENT)
Dept: UROLOGY | Facility: CLINIC | Age: 56
End: 2021-06-09
Payer: MEDICARE

## 2021-06-09 ENCOUNTER — RESULT REVIEW (OUTPATIENT)
Age: 56
End: 2021-06-09

## 2021-06-09 VITALS — BODY MASS INDEX: 31.81 KG/M2 | HEIGHT: 73 IN | TEMPERATURE: 98.1 F | WEIGHT: 240 LBS

## 2021-06-09 DIAGNOSIS — R39.9 UNSPECIFIED SYMPTOMS AND SIGNS INVOLVING THE GENITOURINARY SYSTEM: ICD-10-CM

## 2021-06-09 DIAGNOSIS — D17.9 BENIGN LIPOMATOUS NEOPLASM, UNSPECIFIED: ICD-10-CM

## 2021-06-09 PROCEDURE — 99214 OFFICE O/P EST MOD 30 MIN: CPT

## 2021-06-09 PROCEDURE — 99072 ADDL SUPL MATRL&STAF TM PHE: CPT

## 2021-06-09 NOTE — HISTORY OF PRESENT ILLNESS
[FreeTextEntry1] : CAMILLE DEUTSCH is a 56 year old male with a past medical history of elevated PSA. Presents to the office today for a follow up after he failed to follow up in 7/2019 for prostate biopsy and cancelled numerous appointments over the summer. \par \par Patient had a PSA of 10.5 ng/ml on 3/2019 \par PSA is 18.1 ng/ml and 5.2 % Free PSA. No urinary issues, voiding well. Denies dysuria, gross hematuria, flank pain, fever, or chills. Denies family history of elevated PSA. Non smoker. 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

## 2021-06-09 NOTE — LETTER BODY
[Dear  ___] : Dear  [unfilled], [Consult Letter:] : I had the pleasure of evaluating your patient, [unfilled]. [Please see my note below.] : Please see my note below. [Consult Closing:] : Thank you very much for allowing me to participate in the care of this patient.  If you have any questions, please do not hesitate to contact me. [Sincerely,] : Sincerely, [FreeTextEntry1] : Prostate biopsy was negative on April 2021 [FreeTextEntry3] : Varinder Sunshine MD\par Director of Male Sexual Dysfunction and Urologic Prosthetics

## 2021-06-27 ENCOUNTER — OUTPATIENT (OUTPATIENT)
Dept: OUTPATIENT SERVICES | Facility: HOSPITAL | Age: 56
LOS: 1 days | Discharge: HOME | End: 2021-06-27
Payer: MEDICARE

## 2021-06-27 DIAGNOSIS — D17.9 BENIGN LIPOMATOUS NEOPLASM, UNSPECIFIED: ICD-10-CM

## 2021-06-27 PROCEDURE — 76775 US EXAM ABDO BACK WALL LIM: CPT | Mod: 26

## 2021-11-03 ENCOUNTER — APPOINTMENT (OUTPATIENT)
Dept: UROLOGY | Facility: CLINIC | Age: 56
End: 2021-11-03

## 2021-11-15 ENCOUNTER — APPOINTMENT (OUTPATIENT)
Dept: OTOLARYNGOLOGY | Facility: CLINIC | Age: 56
End: 2021-11-15
Payer: MEDICARE

## 2021-11-15 DIAGNOSIS — H60.509 UNSPECIFIED ACUTE NONINFECTIVE OTITIS EXTERNA, UNSPECIFIED EAR: ICD-10-CM

## 2021-11-15 DIAGNOSIS — R09.81 NASAL CONGESTION: ICD-10-CM

## 2021-11-15 PROCEDURE — 92557 COMPREHENSIVE HEARING TEST: CPT

## 2021-11-15 PROCEDURE — 92550 TYMPANOMETRY & REFLEX THRESH: CPT

## 2021-11-15 PROCEDURE — 31231 NASAL ENDOSCOPY DX: CPT

## 2021-11-15 PROCEDURE — 99214 OFFICE O/P EST MOD 30 MIN: CPT | Mod: 25

## 2021-11-15 NOTE — ASSESSMENT
[FreeTextEntry1] : I counseled the patient regarding avoiding overusing qtips and explained the risks of infection, eardrum perforation, ear canal irritation, and injury, impacted wax with conductive hearing loss...\par \par I reviewed, interpreted, and discussed the Audiogram done today. Bilateral snhl. \par

## 2021-11-22 LAB — BACTERIA SPEC CULT: ABNORMAL

## 2021-12-10 ENCOUNTER — APPOINTMENT (OUTPATIENT)
Dept: PULMONOLOGY | Facility: CLINIC | Age: 56
End: 2021-12-10
Payer: MEDICARE

## 2021-12-10 ENCOUNTER — OUTPATIENT (OUTPATIENT)
Dept: OUTPATIENT SERVICES | Facility: HOSPITAL | Age: 56
LOS: 1 days | Discharge: HOME | End: 2021-12-10

## 2021-12-10 VITALS
OXYGEN SATURATION: 98 % | BODY MASS INDEX: 33.13 KG/M2 | DIASTOLIC BLOOD PRESSURE: 92 MMHG | HEART RATE: 77 BPM | TEMPERATURE: 97.3 F | SYSTOLIC BLOOD PRESSURE: 143 MMHG | WEIGHT: 250 LBS | HEIGHT: 73 IN

## 2021-12-10 PROCEDURE — 99213 OFFICE O/P EST LOW 20 MIN: CPT

## 2021-12-10 NOTE — HISTORY OF PRESENT ILLNESS
[FreeTextEntry1] : followup  [de-identified] : 54 yo m with pmh of DM, HTN, HLD, enlarged prostate presents for follow up of chronic shortness of breath with exertion.\par Pt was seen in 2019 and at that time PFT with bronchodilators was performed but not able to be interpreted due to poor effort. TTE was also ordered at that time which showed LVEF of 49% with no evidence of Pulmonary HTN. Prior to that PFT study, he had a study done with his PMD showing FVC is 25% of predicted, FEV1 34% of predicted, FEV1/% of predicted.\par \par Interval Hx: Pt had COVID in 1/2021, was hospitalized for 5 days, was discharged on O2 which he required for 2 months. Has been on room air since, and feels his breathing has returned to normal. Is using an albuterol inhaler twice a day. \par

## 2021-12-10 NOTE — ASSESSMENT
[FreeTextEntry1] : Significant restrictive lung disease\par Pectus excavatum.  Refused CTS intervention\par SO COVID in January \par Possible ZARIA

## 2021-12-13 ENCOUNTER — NON-APPOINTMENT (OUTPATIENT)
Age: 56
End: 2021-12-13

## 2022-01-05 ENCOUNTER — APPOINTMENT (OUTPATIENT)
Dept: UROLOGY | Facility: CLINIC | Age: 57
End: 2022-01-05
Payer: MEDICARE

## 2022-01-05 DIAGNOSIS — N28.1 CYST OF KIDNEY, ACQUIRED: ICD-10-CM

## 2022-01-05 PROCEDURE — 99442: CPT

## 2022-01-05 NOTE — HISTORY OF PRESENT ILLNESS
[Home] : at home, [unfilled] , at the time of the visit. [Medical Office: ([facility])___] : at the medical office located in  [Verbal consent obtained from patient] : the patient, [unfilled] [FreeTextEntry1] : Telephonic visit -- [facility] FOR FOLLOW UP APPOINTMENT\par \par The patient-doctor relationship has been established in an audio HIPAA compliant communication using telemedicine software. The patient's identity has been confirmed. The patient was previously emailed a copy of the consent. They have had a chance to review and has now given verbal consent and has requested care to be assessed and treated through telephone and understands there maybe limitations in this process and they may need further follow up care in the office and or hospital settings.\par \par The patient denies fevers, chills, nausea and or vomiting and no unexplained weight loss.\par \par All pertinent parts of the patient PFSH (past medical, family and social histories), laboratory, radiological studies and physician notes were reviewed prior to starting the visit. Questionnaire results were discussed with patient.\par \par ==================================================================================================== \par \par CAMILLE DEUTSCH is a 56 year old male with a past medical history of elevated PSA. \par  he failed to follow up in 7/2019 for prostate biopsy and cancelled numerous appointments over the summer. \par \par Patient had a PSA of 10.5 ng/ml on 3/2019 \par PSA is 18.1 ng/ml and 5.2 % Free PSA. No urinary issues, voiding well. Denies dysuria, gross hematuria, flank pain, fever, or chills. Denies family history of elevated PSA. Non smoker. 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 \par \par June 2021\par US Renal; simple cysts within the kidneys -- no evidence of AML\par \par states that in Dec 2021 had psa of 21 but I don’t have that result\par \par [phone]\par [email or web address]

## 2022-01-19 ENCOUNTER — LABORATORY RESULT (OUTPATIENT)
Age: 57
End: 2022-01-19

## 2022-01-20 LAB
APPEARANCE: CLEAR
BILIRUBIN URINE: NEGATIVE
BLOOD URINE: NEGATIVE
COLOR: YELLOW
GLUCOSE QUALITATIVE U: ABNORMAL
KETONES URINE: NEGATIVE
LEUKOCYTE ESTERASE URINE: ABNORMAL
NITRITE URINE: NEGATIVE
PH URINE: 6
PROTEIN URINE: NORMAL
PSA FREE FLD-MCNC: 5 %
PSA FREE SERPL-MCNC: 1.22 NG/ML
PSA SERPL-MCNC: 24.3 NG/ML
SPECIFIC GRAVITY URINE: 1.02
UROBILINOGEN URINE: NORMAL

## 2022-01-21 LAB — BACTERIA UR CULT: NORMAL

## 2022-01-24 ENCOUNTER — APPOINTMENT (OUTPATIENT)
Dept: UROLOGY | Facility: CLINIC | Age: 57
End: 2022-01-24
Payer: MEDICARE

## 2022-01-24 DIAGNOSIS — D30.01 BENIGN NEOPLASM OF RIGHT KIDNEY: ICD-10-CM

## 2022-01-24 DIAGNOSIS — N52.01 ERECTILE DYSFUNCTION DUE TO ARTERIAL INSUFFICIENCY: ICD-10-CM

## 2022-01-24 DIAGNOSIS — R39.16 STRAINING TO VOID: ICD-10-CM

## 2022-01-24 PROCEDURE — 99213 OFFICE O/P EST LOW 20 MIN: CPT | Mod: 95

## 2022-01-24 NOTE — HISTORY OF PRESENT ILLNESS
[Home] : at home, [unfilled] , at the time of the visit. [Medical Office: ([facility])___] : at the medical office located in  [Verbal consent obtained from patient] : the patient, [unfilled] [FreeTextEntry1] : TELEMEDICINE -- [facility] FOR FOLLOW UP APPOINTMENT\par \par The patient-doctor relationship has been established in a face to face fashion via real time video/audio HIPAA compliant communication using telemedicine software. The patient's identity has been confirmed. The patient was previously emailed a copy of the telemedicine consent. They have had a chance to review and has now given verbal consent and has requested care to be assessed and treated through telemedicine and understands there maybe limitations in this process and they may need further follow up care in the office and or hospital settings.\par \par The patient denies fevers, chills, nausea and or vomiting and no unexplained weight loss.\par \par All pertinent parts of the patient PFSH (past medical, family and social histories), laboratory, radiological studies and physician notes were reviewed prior to starting the face to face portion of the telemedicine visit. Questionnaire results were discussed with patient.\par \par ==================================================================================================== \par \par CAMILLE DEUTSCH is a 56 year old male with a past medical history of elevated PSA. \par  he failed to follow up in 7/2019 for prostate biopsy and cancelled numerous appointments over the summer. \par \par Patient had a PSA of 10.5 ng/ml on 3/2019 \par PSA is 18.1 ng/ml and 5.2 % Free PSA. No urinary issues, voiding well. Denies dysuria, gross hematuria, flank pain, fever, or chills. Denies family history of elevated PSA. Non smoker. 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 \par \par June 2021\par US Renal; simple cysts within the kidneys -- no evidence of AML\par \par \par states that in Dec 2021 had psa of 21 but I don’t have that result\par \par Jan 2022\par Culture - Urine; negative\par PSA Profile - Total = 24\par Urinalysis w/Reflex; glucose 100, large LE\par \par repeat renal sonogram Jan 2023\par \par \par \par [phone]\par \par [email or web address] \par labs in chart

## 2022-01-24 NOTE — ASSESSMENT
[FreeTextEntry1] : CAMILLE DEUTSCH is a 56 year old male with a past medical history of elevated PSA. \par  he failed to follow up in 7/2019 for prostate biopsy and cancelled numerous appointments over the summer. \par \par Patient had a PSA of 10.5 ng/ml on 3/2019 \par PSA is 18.1 ng/ml and 5.2 % Free PSA. No urinary issues, voiding well. Denies dysuria, gross hematuria, flank pain, fever, or chills. Denies family history of elevated PSA. Non smoker. 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 \par \par June 2021\par US Renal; simple cysts within the kidneys -- no evidence of AML\par \par \par states that in Dec 2021 had psa of 21 but I don’t have that result\par \par Jan 2022\par Culture - Urine; negative\par PSA Profile - Total = 24\par Urinalysis w/Reflex; glucose 100, large LE\par \par repeat renal sonogram Jan 2023\par \par

## 2022-02-09 ENCOUNTER — APPOINTMENT (OUTPATIENT)
Dept: OTOLARYNGOLOGY | Facility: CLINIC | Age: 57
End: 2022-02-09
Payer: MEDICARE

## 2022-02-09 DIAGNOSIS — J34.89 OTHER SPECIFIED DISORDERS OF NOSE AND NASAL SINUSES: ICD-10-CM

## 2022-02-09 DIAGNOSIS — L29.9 PRURITUS, UNSPECIFIED: ICD-10-CM

## 2022-02-09 PROCEDURE — 31231 NASAL ENDOSCOPY DX: CPT

## 2022-02-09 PROCEDURE — 99213 OFFICE O/P EST LOW 20 MIN: CPT | Mod: 25

## 2022-02-09 NOTE — HISTORY OF PRESENT ILLNESS
[de-identified] : Patient here today c/o b/l ear itching. Has been going on for 6 months.  Patient denies any otalgia. HX diabetes.  He has been using peroxide and cortisporin. He was recently given neomycin polymycin drops which helps but then the itching comes back. He has not used the drops in many months.  Occasional tinnitus. Some hearing loss in both ears. No otalgia No ottorhea. \par \par \par 4/17/2020: Patient here today following up on acute otitis externa and ear itching. Patient admits having ear itching for the past few days. it is on both sides now. He is using acetic acid. Drainage + no fever\par \par \par 4/24/2020: Patient following up on acute otitis externa. Patient continues to have bilateral itching. He has been using floxin drops. Otalgia when he does itch his ear. No change in hearing. Patient is diabetic.\par \par 05/01/2020 Patient was here last week for acute otitis externa he is here today for 1 wk f/u. he feels better, but still complaining of itchy ears. He was put on abx drops and clotrimazole. \par \par 6/26/2020: Patient presents today following up on acute otitis externa. previously treated. Patient c/o itching in b/l ears, intermittent, mild. \par No change in hearing. No swelling. . \par \par \par 7/8/2020: Patient presents today following up on ear itching and acute otitis externa. Patient had culture taken at last visit. Continues to use drops/ Severe itching in b/l ears.  He admits he has been scratching them a lot. The drops helped but still not normal yet. \par Hearing is off and on. \par \par 7/15/2020: Patient following up on acute otitis externa. \par \par \par 8/19/2020: Patient following up on acute otitis externa. Itching. Some bleeding in the right ear due to itching. no draiunage. no pain.\par \par He tried peroxide but it burned. No change in hearing. \par \par \par 9/9/2020: Patient following up on acute otitis externa. Patents state ear still feel itchy on both ears, bleeds liitle. still uses the medication when it hurts.\par \par His hearing has also changed on both sides. \par \par \par 2/16/21: Patient presents today following up on acute otitis externa and ear itching. Patient admits ear itching stopped for about 1 month but later returned. No otalgia associated. \par \par \par 2/26/21: Patient following up on acute otitis externa. Patient admits improvement since last visit. Less itching. Cultures taken at last visit. \par \par 2/26/21: Patient following up on acute otitis externa. Having off and on swelling  of the ear and itchiness. Was taking ciprofloxacin (for prostatitis) and swelling resoled. \par \par 11/15/21: Patient following up on acute otitis externa. Patient admits using drops but ran out. He admits itching in b/l ears. Also complaining of nasal congestion, uses flonase at times.   [FreeTextEntry1] : 02/09/22 : Patient returns today following up on  ear itching. Nasal congestion  comes  and goes .  Has been using  [employer]  nasal  spray and  Zyrtec  .  \par B/l ear itching , fluocinolone helping. \par He is also complaining of nasal irrigation.

## 2022-02-09 NOTE — ASSESSMENT
[FreeTextEntry1] : continue fluocinolone and flonase. Advised to use bacitracin in the nose for dryness.

## 2022-03-18 NOTE — HISTORY OF PRESENT ILLNESS
[de-identified] : Patient here today c/o b/l ear itching. Has been going on for 6 months.  Patient denies any otalgia. HX diabetes.  He has been using peroxide and cortisporin. He was recently given neomycin polymycin drops which helps but then the itching comes back. He has not used the drops in many months.  Occasional tinnitus. Some hearing loss in both ears. No otalgia No ottorhea. \par \par \par 4/17/2020: Patient here today following up on acute otitis externa and ear itching. Patient admits having ear itching for the past few days. it is on both sides now. He is using acetic acid. Drainage + no fever\par \par \par 4/24/2020: Patient following up on acute otitis externa. Patient continues to have bilateral itching. He has been using floxin drops. Otalgia when he does itch his ear. No change in hearing. Patient is diabetic.\par \par 05/01/2020 Patient was here last week for acute otitis externa he is here today for 1 wk f/u. he feels better, but still complaining of itchy ears. He was put on abx drops and clotrimazole. \par \par 6/26/2020: Patient presents today following up on acute otitis externa. previously treated. Patient c/o itching in b/l ears, intermittent, mild. \par No change in hearing. No swelling. . \par \par \par 7/8/2020: Patient presents today following up on ear itching and acute otitis externa. Patient had culture taken at last visit. Continues to use drops/ Severe itching in b/l ears.  He admits he has been scratching them a lot. The drops helped but still not normal yet. \par Hearing is off and on. \par \par 7/15/2020: Patient following up on acute otitis externa. \par \par \par 8/19/2020: Patient following up on acute otitis externa. Itching. Some bleeding in the right ear due to itching. no draiunage. no pain.\par \par He tried peroxide but it burned. No change in hearing. \par \par \par 9/9/2020: Patient following up on acute otitis externa. Patents state ear still feel itchy on both ears, bleeds liitle. still uses the medication when it hurts.\par \par His hearing has also changed on both sides. \par \par \par 2/16/21: Patient presents today following up on acute otitis externa and ear itching. Patient admits ear itching stopped for about 1 month but later returned. No otalgia associated. \par \par \par 2/26/21: Patient following up on acute otitis externa. Patient admits improvement since last visit. Less itching. Cultures taken at last visit. \par \par 2/26/21: Patient following up on acute otitis externa. Having off and on swelling  of the ear and itchiness. Was taking ciprofloxacin (for prostatitis) and swelling resoled.  [FreeTextEntry1] : 11/15/21: Patient following up on acute otitis externa. Patient admits using drops but ran out. He admits itching in b/l ears. Also complaining of nasal congestion, uses flonase at times.   Cell Phone/PDA (specify)/Clothing

## 2022-04-13 ENCOUNTER — OUTPATIENT (OUTPATIENT)
Dept: OUTPATIENT SERVICES | Facility: HOSPITAL | Age: 57
LOS: 1 days | Discharge: HOME | End: 2022-04-13
Payer: MEDICARE

## 2022-04-13 ENCOUNTER — RESULT REVIEW (OUTPATIENT)
Age: 57
End: 2022-04-13

## 2022-04-13 DIAGNOSIS — N52.01 ERECTILE DYSFUNCTION DUE TO ARTERIAL INSUFFICIENCY: ICD-10-CM

## 2022-04-13 PROCEDURE — 72197 MRI PELVIS W/O & W/DYE: CPT | Mod: 26

## 2022-04-20 ENCOUNTER — NON-APPOINTMENT (OUTPATIENT)
Age: 57
End: 2022-04-20

## 2022-05-16 ENCOUNTER — APPOINTMENT (OUTPATIENT)
Dept: OTOLARYNGOLOGY | Facility: CLINIC | Age: 57
End: 2022-05-16

## 2022-05-20 ENCOUNTER — NON-APPOINTMENT (OUTPATIENT)
Age: 57
End: 2022-05-20

## 2022-05-20 ENCOUNTER — APPOINTMENT (OUTPATIENT)
Dept: UROLOGY | Facility: CLINIC | Age: 57
End: 2022-05-20
Payer: MEDICARE

## 2022-05-20 VITALS — BODY MASS INDEX: 33.13 KG/M2 | WEIGHT: 250 LBS | HEIGHT: 73 IN

## 2022-05-20 DIAGNOSIS — N40.0 BENIGN PROSTATIC HYPERPLASIA WITHOUT LOWER URINARY TRACT SYMPMS: ICD-10-CM

## 2022-05-20 DIAGNOSIS — R97.20 ELEVATED PROSTATE, SPECIFIC ANTIGEN [PSA]: ICD-10-CM

## 2022-05-20 PROCEDURE — 99213 OFFICE O/P EST LOW 20 MIN: CPT

## 2022-05-20 NOTE — ASSESSMENT
[FreeTextEntry1] : \par CAMILLE DEUTSCH is a 56 year old male with a past medical history of elevated PSA. \par  he failed to follow up in 7/2019 for prostate biopsy and cancelled numerous appointments over the summer. \par \par Patient had a PSA of 10.5 ng/ml on 3/2019 \par PSA is 18.1 ng/ml and 5.2 % Free PSA. No urinary issues, voiding well. Denies dysuria, gross hematuria, flank pain, fever, or chills. Denies family history of elevated PSA. Non smoker. 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 \par \par June 2021\par US Renal; simple cysts within the kidneys -- no evidence of AML\par \par states that in Dec 2021 had psa of 21 but I don’t have that result\par \par Jan 2022\par Culture - Urine; negative\par PSA Profile - Total = 24\par Urinalysis w/Reflex; glucose 100, large LE\par \par repeat renal sonogram Jan 2023\par \par April 2022\par MR Prostate w/wo IV Cont;\par no suspicious lesions -- pirdas 2 -- prostate 108g -- density of 0.23

## 2022-05-20 NOTE — HISTORY OF PRESENT ILLNESS
[FreeTextEntry1] : \par CAMILLE DEUTSCH is a 56 year old male with a past medical history of elevated PSA. \par  he failed to follow up in 7/2019 for prostate biopsy and cancelled numerous appointments over the summer. \par \par Patient had a PSA of 10.5 ng/ml on 3/2019 \par PSA is 18.1 ng/ml and 5.2 % Free PSA. No urinary issues, voiding well. Denies dysuria, gross hematuria, flank pain, fever, or chills. Denies family history of elevated PSA. Non smoker. 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 \par \par June 2021\par US Renal; simple cysts within the kidneys -- no evidence of AML\par \par states that in Dec 2021 had psa of 21 but I don’t have that result\par \par Jan 2022\par Culture - Urine; negative\par PSA Profile - Total = 24\par Urinalysis w/Reflex; glucose 100, large LE\par \par repeat renal sonogram Jan 2023\par \par April 2022\par MR Prostate w/wo IV Cont;\par no suspicious lesions -- pirdas 2 -- prostate 108g -- density of 0.23\par

## 2022-05-21 ENCOUNTER — TRANSCRIPTION ENCOUNTER (OUTPATIENT)
Age: 57
End: 2022-05-21

## 2022-05-24 ENCOUNTER — APPOINTMENT (OUTPATIENT)
Age: 57
End: 2022-05-24

## 2022-06-10 ENCOUNTER — NON-APPOINTMENT (OUTPATIENT)
Age: 57
End: 2022-06-10

## 2022-06-10 ENCOUNTER — APPOINTMENT (OUTPATIENT)
Dept: PULMONOLOGY | Facility: CLINIC | Age: 57
End: 2022-06-10
Payer: MEDICARE

## 2022-06-10 ENCOUNTER — OUTPATIENT (OUTPATIENT)
Dept: OUTPATIENT SERVICES | Facility: HOSPITAL | Age: 57
LOS: 1 days | Discharge: HOME | End: 2022-06-10

## 2022-06-10 VITALS
HEIGHT: 73 IN | SYSTOLIC BLOOD PRESSURE: 138 MMHG | OXYGEN SATURATION: 98 % | DIASTOLIC BLOOD PRESSURE: 93 MMHG | WEIGHT: 246 LBS | HEART RATE: 73 BPM | BODY MASS INDEX: 32.6 KG/M2

## 2022-06-10 DIAGNOSIS — R05.8 OTHER SPECIFIED COUGH: ICD-10-CM

## 2022-06-10 DIAGNOSIS — J98.4 OTHER DISORDERS OF LUNG: ICD-10-CM

## 2022-06-10 PROCEDURE — 99213 OFFICE O/P EST LOW 20 MIN: CPT | Mod: GC

## 2022-06-10 NOTE — ASSESSMENT
[FreeTextEntry1] : 56 yo m with pmh of DM, HTN, HLD, and enlarged prostate who presents for follow up for management of dyspnea of exertion.  Patient was last seen in pulmonology clinic in Dec 2021.  PFTs from PCP in 2019 showed restrictive lung pattern.\par \par #Restrictive disease, likely 2/2 chest wall deformity \par - repeat PFTs\par - c/w albuterol inhaler PRN\par - recommended for sleep study, pt refused\par \par RTC in 3 months to review PFTs or PRN.

## 2022-06-10 NOTE — PHYSICAL EXAM
[No Acute Distress] : no acute distress [Well Nourished] : well nourished [Normal Oropharynx] : normal oropharynx [Normal Appearance] : normal appearance [No Neck Mass] : no neck mass [Normal Rate/Rhythm] : normal rate/rhythm [Normal S1, S2] : normal s1, s2 [No Murmurs] : no murmurs [No Resp Distress] : no resp distress [No Acc Muscle Use] : no acc muscle use [Clear to Auscultation Bilaterally] : clear to auscultation bilaterally [Benign] : benign [Oriented x3] : oriented x3 [Normal Affect] : normal affect

## 2022-06-10 NOTE — HISTORY OF PRESENT ILLNESS
[Never] : never [TextBox_4] : 58 yo m with pmh of DM, HTN, HLD, enlarged prostate, history of pectus excavatum and restrictive lung disease secondary to that, who presents for 6-month follow-up. Patient last seen in pulmonary clinic in December 2021.  Since last visit, patient had a second COVID infection approx 3 weeks ago.  Did not require hospitalization but has lingering symptoms including nasal congestion and dyspnea.\par \par Of note, pt was seen in 2019 and at that time PFT with bronchodilators was performed but not able to be interpreted due to poor effort. TTE was also ordered at that time which showed LVEF of 49% with no evidence of Pulmonary HTN. Prior to that PFT study, he had a study done with his PMD showing FVC is 25% of predicted, FEV1 34% of predicted, FEV1/% of predicted.  PFTs ordered at last visit but no record of being performed.\par

## 2022-06-10 NOTE — REVIEW OF SYSTEMS
[Eye Irritation] : eye irritation [Nasal Congestion] : nasal congestion [Postnasal Drip] : postnasal drip [Cough] : cough [Dyspnea] : dyspnea [Fever] : no fever [Chills] : no chills [Hemoptysis] : no hemoptysis [Sputum] : no sputum

## 2022-06-10 NOTE — END OF VISIT
[] : Resident [FreeTextEntry3] : Agree with above.\par Likely has ZARIA; refused sleep study\par Shrotness of breath with exertion could be related to pectus excavatum and being overweight/deconditioning/CV disease.\par Check PFTs

## 2022-06-15 ENCOUNTER — APPOINTMENT (OUTPATIENT)
Dept: UROLOGY | Facility: CLINIC | Age: 57
End: 2022-06-15
Payer: MEDICARE

## 2022-06-15 PROCEDURE — 99214 OFFICE O/P EST MOD 30 MIN: CPT

## 2022-06-15 NOTE — HISTORY OF PRESENT ILLNESS
[FreeTextEntry1] : 57-year-old with history of elevated PSA now 24.  He has had 2 benign biopsies of the prostate, last 1 in April 2021.  2020 there was high-grade PIN.  2021 was a benign biopsy.  PSA is rising so a MRI of the prostate showed no suspicious lesions, 108 cc prostate with a PSA density of 0.23.  He has variable urinary stream is slow stream in the morning but otherwise normal.

## 2022-06-15 NOTE — ASSESSMENT
[FreeTextEntry1] : High PSA and PSA density with history of benign biopsy as recently as 2021 1 year ago.  He had another biopsy showing a focus of high-grade PIN.  Because of his age and rising PSA and high PSA density I recommend a repeat transperineal biopsy of the prostate.  I have discussed the transperineal approach and also of the transrectal approach and explained that we now do exclusively the transperineal approach to minimize infection and sepsis.  I offered him office for in hospital as the patient had pain with the last biopsy.  I recommend that he undergo biopsy however patient refuses at this time and alternatives were therefore discussed including me at least trending the PSA versus starting finasteride and rechecking PSA to check for appropriate response.  He would like to try this and understands that he must be completely compliant with the medication and if his PSA does not drop appropriately then a biopsy would need to be done then.  Patient is agreeable to this approach

## 2022-06-15 NOTE — REVIEW OF SYSTEMS
[Feeling Poorly] : not feeling poorly [Chest Pain] : no chest pain [Shortness Of Breath] : no shortness of breath [Cough] : no cough [Constipation] : no constipation [Confused] : no confusion

## 2022-09-08 LAB — PSA SERPL-MCNC: 25.7 NG/ML

## 2022-10-04 ENCOUNTER — APPOINTMENT (OUTPATIENT)
Dept: UROLOGY | Facility: CLINIC | Age: 57
End: 2022-10-04

## 2022-10-04 VITALS
BODY MASS INDEX: 32.87 KG/M2 | WEIGHT: 248 LBS | RESPIRATION RATE: 16 BRPM | HEIGHT: 73 IN | HEART RATE: 82 BPM | DIASTOLIC BLOOD PRESSURE: 99 MMHG | SYSTOLIC BLOOD PRESSURE: 150 MMHG

## 2022-10-04 DIAGNOSIS — N13.8 BENIGN PROSTATIC HYPERPLASIA WITH LOWER URINARY TRACT SYMPMS: ICD-10-CM

## 2022-10-04 DIAGNOSIS — Z00.00 ENCOUNTER FOR GENERAL ADULT MEDICAL EXAMINATION W/OUT ABNORMAL FINDINGS: ICD-10-CM

## 2022-10-04 DIAGNOSIS — R97.20 ELEVATED PROSTATE, SPECIFIC ANTIGEN [PSA]: ICD-10-CM

## 2022-10-04 DIAGNOSIS — N40.1 BENIGN PROSTATIC HYPERPLASIA WITH LOWER URINARY TRACT SYMPMS: ICD-10-CM

## 2022-10-04 LAB
BILIRUB UR QL STRIP: NORMAL
COLLECTION METHOD: NORMAL
GLUCOSE UR-MCNC: 250
HCG UR QL: 0.2 EU/DL
HGB UR QL STRIP.AUTO: NORMAL
KETONES UR-MCNC: NORMAL
LEUKOCYTE ESTERASE UR QL STRIP: NORMAL
NITRITE UR QL STRIP: NORMAL
PH UR STRIP: 5.5
PROT UR STRIP-MCNC: NORMAL
SP GR UR STRIP: 1.03

## 2022-10-04 PROCEDURE — 99213 OFFICE O/P EST LOW 20 MIN: CPT

## 2022-10-04 PROCEDURE — 81003 URINALYSIS AUTO W/O SCOPE: CPT | Mod: QW

## 2022-10-04 NOTE — HISTORY OF PRESENT ILLNESS
[FreeTextEntry1] : 57-year-old with history of elevated PSA.  In June 2022 PSA was 24.  He has history of 2 benign prostate biopsies in the past.  2020 there was high-grade PIN and 2021 was a benign biopsy.  MRI of prostate in April 2022 showed no suspicious lesions and 108 cc prostate with a PSA density of 0.23.  Last visit patient adamantly refused to undergo transperineal prostate biopsy.  He was recommended to start finasteride 5 mg daily and to repeat PSA for today's visit in order to assess response to finasteride.  Patient reports that he did not take finasteride as directed and discontinued the medication after a month due to "taking too much medication."  PSA in September 2022 is 25.7 ng/mL.\par \par Patient reports he is urinating well.  Denies dysuria and gross hematuria.\par \par Urinalysis dipstick today is negative for blood, nitrites, and leukocytes.

## 2022-10-04 NOTE — ASSESSMENT
[FreeTextEntry1] : 57-year-old with elevated PSA.  High PSA density.  History of benign biopsies in the past and 1 biopsy showing high-grade PIN.  He was noncompliant with finasteride 5 mg so accurately determining response to medication is impossible.\par \par I reviewed with patient PSA which is now 25.7 ng/mL.  I explained the possibility of a deadly life shortening prostate cancer being present and again recommend patient undergo transperineal prostate biopsy.  Patient again refuses stating "he is sensitive."  Explained that Biopsy will be a transperineal approach which is different from biopsies he had in the past.  Also explained that biopsy would be done under a short course of general anesthesia.  Patient verbalized understanding that this is the recommendation given his PSA and concern for prostate cancer, and he again refuses to schedule.\par \par Patient states that he would like to try to take finasteride 5 mg daily and repeat the PSA again.  I explained the importance again of taking the medication every single day as it will affect the way we interpret the PSA number.  Patient verbalized understanding and states that he will take it every single day.\par \par Plan\par -Patient refusing biopsy\par -Follow-up 3 months with repeat PSA.  Finasteride 5 mg daily.  History of noncompliance with this in the past\par -Patient will call office if he reconsiders to schedule prostate biopsy.

## 2022-10-11 NOTE — REVIEW OF SYSTEMS
[Patient Intake Form Reviewed] : Patient intake form was reviewed [As Noted in HPI] : as noted in HPI [Negative] : Heme/Lymph Tranexamic Acid Pregnancy And Lactation Text: It is unknown if this medication is safe during pregnancy or breast feeding.

## 2022-11-19 ENCOUNTER — LABORATORY RESULT (OUTPATIENT)
Age: 57
End: 2022-11-19

## 2022-11-22 ENCOUNTER — OUTPATIENT (OUTPATIENT)
Dept: OUTPATIENT SERVICES | Facility: HOSPITAL | Age: 57
LOS: 1 days | Discharge: HOME | End: 2022-11-22

## 2022-11-22 DIAGNOSIS — J98.4 OTHER DISORDERS OF LUNG: ICD-10-CM

## 2022-11-22 DIAGNOSIS — R06.02 SHORTNESS OF BREATH: ICD-10-CM

## 2022-11-22 PROCEDURE — 94729 DIFFUSING CAPACITY: CPT | Mod: 26

## 2022-11-22 PROCEDURE — 94060 EVALUATION OF WHEEZING: CPT | Mod: 26

## 2022-11-22 PROCEDURE — 94727 GAS DIL/WSHOT DETER LNG VOL: CPT | Mod: 26

## 2022-11-29 ENCOUNTER — OUTPATIENT (OUTPATIENT)
Dept: OUTPATIENT SERVICES | Facility: HOSPITAL | Age: 57
LOS: 1 days | Discharge: HOME | End: 2022-11-29

## 2022-11-29 ENCOUNTER — APPOINTMENT (OUTPATIENT)
Dept: PULMONOLOGY | Facility: CLINIC | Age: 57
End: 2022-11-29

## 2022-11-29 ENCOUNTER — NON-APPOINTMENT (OUTPATIENT)
Age: 57
End: 2022-11-29

## 2022-11-29 VITALS
HEIGHT: 73 IN | DIASTOLIC BLOOD PRESSURE: 93 MMHG | WEIGHT: 240 LBS | OXYGEN SATURATION: 97 % | TEMPERATURE: 97.7 F | SYSTOLIC BLOOD PRESSURE: 147 MMHG | HEART RATE: 95 BPM | BODY MASS INDEX: 31.81 KG/M2

## 2022-11-29 DIAGNOSIS — J98.4 OTHER DISORDERS OF LUNG: ICD-10-CM

## 2022-11-29 DIAGNOSIS — E66.9 OTHER DISORDERS OF LUNG: ICD-10-CM

## 2022-11-29 PROCEDURE — 99214 OFFICE O/P EST MOD 30 MIN: CPT | Mod: GC

## 2022-11-29 NOTE — END OF VISIT
[] : Resident [FreeTextEntry3] : Mr Apple is a patient of Dr Guanako Daugherty who has now had his PFT read and performed, showing restrictive disease with preserved DLCO.  Patient would benefit from neuromuscular panel PFT, as well as HRCT to rule-out intrinsic lung disease.

## 2022-11-29 NOTE — HISTORY OF PRESENT ILLNESS
[Never] : never [TextBox_4] : 57 year old male with PMH of DM, HTN, HLD, enlarged prostate, history of pectus excavatum and restrictive lung disease secondary to that, who presents for follow up. Patient continues to endorse SOB on exertion which he states has been ongoing for the past 6-7 years, states it was worse after covid. 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 Denies smoking, drinking, and drug use.

## 2022-11-29 NOTE — ASSESSMENT
[FreeTextEntry1] : 57 year old male with PMH of DM, HTN, HLD, enlarged prostate, history of pectus excavatum and restrictive lung disease secondary to that, who presents for follow up.\par \par # AMIN \par - PFT in 2019 with bronchodilators was performed but not able to be interpreted due to poor effort. Prior to that PFT study, he had a study done with his PMD showing FVC is 25% of predicted, FEV1 34% of predicted, FEV1/% of predicted. PFTs ordered at last visit but no record of being performed.\par - Most recent PFT showed ongoing restriction with preserved DLCO, most consistent with neuromuscular disease\par - Will send neuromuscular panel PFT\par - TTE was also ordered at that time which showed LVEF of 49% with no evidence of Pulmonary HTN. \par - patient continues to refuse sleep study and CPAP. \par - check HRCT\par - RTC 3 months for Dr Guanako Daugherty

## 2022-12-01 DIAGNOSIS — E66.9 OBESITY, UNSPECIFIED: ICD-10-CM

## 2022-12-01 DIAGNOSIS — R06.09 OTHER FORMS OF DYSPNEA: ICD-10-CM

## 2022-12-01 DIAGNOSIS — G47.33 OBSTRUCTIVE SLEEP APNEA (ADULT) (PEDIATRIC): ICD-10-CM

## 2022-12-01 DIAGNOSIS — J98.4 OTHER DISORDERS OF LUNG: ICD-10-CM

## 2023-01-03 ENCOUNTER — APPOINTMENT (OUTPATIENT)
Dept: UROLOGY | Facility: CLINIC | Age: 58
End: 2023-01-03

## 2023-01-10 ENCOUNTER — OUTPATIENT (OUTPATIENT)
Dept: OUTPATIENT SERVICES | Facility: HOSPITAL | Age: 58
LOS: 1 days | Discharge: HOME | End: 2023-01-10
Payer: MEDICARE

## 2023-01-10 DIAGNOSIS — M25.512 PAIN IN LEFT SHOULDER: ICD-10-CM

## 2023-01-10 PROCEDURE — 73030 X-RAY EXAM OF SHOULDER: CPT | Mod: 26,LT

## 2023-02-17 ENCOUNTER — APPOINTMENT (OUTPATIENT)
Dept: PULMONOLOGY | Facility: CLINIC | Age: 58
End: 2023-02-17

## 2023-03-01 ENCOUNTER — INPATIENT (INPATIENT)
Facility: HOSPITAL | Age: 58
LOS: 2 days | Discharge: LEFT BEFORE DISCHARGE | DRG: 310 | End: 2023-03-04
Attending: INTERNAL MEDICINE | Admitting: INTERNAL MEDICINE
Payer: MEDICARE

## 2023-03-01 VITALS
OXYGEN SATURATION: 96 % | HEIGHT: 74 IN | WEIGHT: 229.94 LBS | HEART RATE: 126 BPM | TEMPERATURE: 99 F | SYSTOLIC BLOOD PRESSURE: 200 MMHG | DIASTOLIC BLOOD PRESSURE: 97 MMHG | RESPIRATION RATE: 19 BRPM

## 2023-03-01 DIAGNOSIS — I48.91 UNSPECIFIED ATRIAL FIBRILLATION: ICD-10-CM

## 2023-03-01 DIAGNOSIS — Q67.6 PECTUS EXCAVATUM: Chronic | ICD-10-CM

## 2023-03-01 LAB
ALBUMIN SERPL ELPH-MCNC: 3.9 G/DL — SIGNIFICANT CHANGE UP (ref 3.5–5.2)
ALBUMIN SERPL ELPH-MCNC: 4.3 G/DL — SIGNIFICANT CHANGE UP (ref 3.5–5.2)
ALP SERPL-CCNC: 64 U/L — SIGNIFICANT CHANGE UP (ref 30–115)
ALP SERPL-CCNC: 73 U/L — SIGNIFICANT CHANGE UP (ref 30–115)
ALT FLD-CCNC: 93 U/L — HIGH (ref 0–41)
ALT FLD-CCNC: 96 U/L — HIGH (ref 0–41)
ANION GAP SERPL CALC-SCNC: 11 MMOL/L — SIGNIFICANT CHANGE UP (ref 7–14)
ANION GAP SERPL CALC-SCNC: 12 MMOL/L — SIGNIFICANT CHANGE UP (ref 7–14)
APTT BLD: 37.4 SEC — SIGNIFICANT CHANGE UP (ref 27–39.2)
APTT BLD: 55 SEC — HIGH (ref 27–39.2)
AST SERPL-CCNC: 32 U/L — SIGNIFICANT CHANGE UP (ref 0–41)
AST SERPL-CCNC: 58 U/L — HIGH (ref 0–41)
BASOPHILS # BLD AUTO: 0.07 K/UL — SIGNIFICANT CHANGE UP (ref 0–0.2)
BASOPHILS NFR BLD AUTO: 0.6 % — SIGNIFICANT CHANGE UP (ref 0–1)
BILIRUB SERPL-MCNC: 0.8 MG/DL — SIGNIFICANT CHANGE UP (ref 0.2–1.2)
BILIRUB SERPL-MCNC: 0.8 MG/DL — SIGNIFICANT CHANGE UP (ref 0.2–1.2)
BUN SERPL-MCNC: 10 MG/DL — SIGNIFICANT CHANGE UP (ref 10–20)
BUN SERPL-MCNC: 13 MG/DL — SIGNIFICANT CHANGE UP (ref 10–20)
CALCIUM SERPL-MCNC: 8.4 MG/DL — SIGNIFICANT CHANGE UP (ref 8.4–10.5)
CALCIUM SERPL-MCNC: 9.2 MG/DL — SIGNIFICANT CHANGE UP (ref 8.4–10.5)
CHLORIDE SERPL-SCNC: 105 MMOL/L — SIGNIFICANT CHANGE UP (ref 98–110)
CHLORIDE SERPL-SCNC: 105 MMOL/L — SIGNIFICANT CHANGE UP (ref 98–110)
CO2 SERPL-SCNC: 19 MMOL/L — SIGNIFICANT CHANGE UP (ref 17–32)
CO2 SERPL-SCNC: 25 MMOL/L — SIGNIFICANT CHANGE UP (ref 17–32)
CREAT SERPL-MCNC: 0.7 MG/DL — SIGNIFICANT CHANGE UP (ref 0.7–1.5)
CREAT SERPL-MCNC: 0.9 MG/DL — SIGNIFICANT CHANGE UP (ref 0.7–1.5)
EGFR: 100 ML/MIN/1.73M2 — SIGNIFICANT CHANGE UP
EGFR: 108 ML/MIN/1.73M2 — SIGNIFICANT CHANGE UP
EOSINOPHIL # BLD AUTO: 0.09 K/UL — SIGNIFICANT CHANGE UP (ref 0–0.7)
EOSINOPHIL NFR BLD AUTO: 0.8 % — SIGNIFICANT CHANGE UP (ref 0–8)
GLUCOSE BLDC GLUCOMTR-MCNC: 117 MG/DL — HIGH (ref 70–99)
GLUCOSE BLDC GLUCOMTR-MCNC: 88 MG/DL — SIGNIFICANT CHANGE UP (ref 70–99)
GLUCOSE SERPL-MCNC: 254 MG/DL — HIGH (ref 70–99)
GLUCOSE SERPL-MCNC: 90 MG/DL — SIGNIFICANT CHANGE UP (ref 70–99)
HCT VFR BLD CALC: 45.4 % — SIGNIFICANT CHANGE UP (ref 42–52)
HGB BLD-MCNC: 15.3 G/DL — SIGNIFICANT CHANGE UP (ref 14–18)
IMM GRANULOCYTES NFR BLD AUTO: 0.4 % — HIGH (ref 0.1–0.3)
INR BLD: 1.35 RATIO — HIGH (ref 0.65–1.3)
INR BLD: 1.47 RATIO — HIGH (ref 0.65–1.3)
LYMPHOCYTES # BLD AUTO: 2.33 K/UL — SIGNIFICANT CHANGE UP (ref 1.2–3.4)
LYMPHOCYTES # BLD AUTO: 21.3 % — SIGNIFICANT CHANGE UP (ref 20.5–51.1)
MCHC RBC-ENTMCNC: 27.5 PG — SIGNIFICANT CHANGE UP (ref 27–31)
MCHC RBC-ENTMCNC: 33.7 G/DL — SIGNIFICANT CHANGE UP (ref 32–37)
MCV RBC AUTO: 81.7 FL — SIGNIFICANT CHANGE UP (ref 80–94)
MONOCYTES # BLD AUTO: 0.63 K/UL — HIGH (ref 0.1–0.6)
MONOCYTES NFR BLD AUTO: 5.7 % — SIGNIFICANT CHANGE UP (ref 1.7–9.3)
NEUTROPHILS # BLD AUTO: 7.8 K/UL — HIGH (ref 1.4–6.5)
NEUTROPHILS NFR BLD AUTO: 71.2 % — SIGNIFICANT CHANGE UP (ref 42.2–75.2)
NRBC # BLD: 0 /100 WBCS — SIGNIFICANT CHANGE UP (ref 0–0)
NT-PROBNP SERPL-SCNC: 990 PG/ML — HIGH (ref 0–300)
PLATELET # BLD AUTO: 250 K/UL — SIGNIFICANT CHANGE UP (ref 130–400)
POTASSIUM SERPL-MCNC: 4 MMOL/L — SIGNIFICANT CHANGE UP (ref 3.5–5)
POTASSIUM SERPL-MCNC: 5.2 MMOL/L — HIGH (ref 3.5–5)
POTASSIUM SERPL-SCNC: 4 MMOL/L — SIGNIFICANT CHANGE UP (ref 3.5–5)
POTASSIUM SERPL-SCNC: 5.2 MMOL/L — HIGH (ref 3.5–5)
PROT SERPL-MCNC: 6.5 G/DL — SIGNIFICANT CHANGE UP (ref 6–8)
PROT SERPL-MCNC: 6.6 G/DL — SIGNIFICANT CHANGE UP (ref 6–8)
PROTHROM AB SERPL-ACNC: 15.5 SEC — HIGH (ref 9.95–12.87)
PROTHROM AB SERPL-ACNC: 16.9 SEC — HIGH (ref 9.95–12.87)
RBC # BLD: 5.56 M/UL — SIGNIFICANT CHANGE UP (ref 4.7–6.1)
RBC # FLD: 14.6 % — HIGH (ref 11.5–14.5)
SARS-COV-2 RNA SPEC QL NAA+PROBE: SIGNIFICANT CHANGE UP
SODIUM SERPL-SCNC: 136 MMOL/L — SIGNIFICANT CHANGE UP (ref 135–146)
SODIUM SERPL-SCNC: 141 MMOL/L — SIGNIFICANT CHANGE UP (ref 135–146)
TROPONIN T SERPL-MCNC: <0.01 NG/ML — SIGNIFICANT CHANGE UP
TROPONIN T SERPL-MCNC: <0.01 NG/ML — SIGNIFICANT CHANGE UP
TSH SERPL-MCNC: 0.64 UIU/ML — SIGNIFICANT CHANGE UP (ref 0.27–4.2)
WBC # BLD: 10.96 K/UL — HIGH (ref 4.8–10.8)
WBC # FLD AUTO: 10.96 K/UL — HIGH (ref 4.8–10.8)

## 2023-03-01 PROCEDURE — 76705 ECHO EXAM OF ABDOMEN: CPT | Mod: 26

## 2023-03-01 PROCEDURE — 83735 ASSAY OF MAGNESIUM: CPT

## 2023-03-01 PROCEDURE — 83036 HEMOGLOBIN GLYCOSYLATED A1C: CPT

## 2023-03-01 PROCEDURE — 78452 HT MUSCLE IMAGE SPECT MULT: CPT

## 2023-03-01 PROCEDURE — 85025 COMPLETE CBC W/AUTO DIFF WBC: CPT

## 2023-03-01 PROCEDURE — 76705 ECHO EXAM OF ABDOMEN: CPT

## 2023-03-01 PROCEDURE — 85610 PROTHROMBIN TIME: CPT

## 2023-03-01 PROCEDURE — 82962 GLUCOSE BLOOD TEST: CPT

## 2023-03-01 PROCEDURE — 85730 THROMBOPLASTIN TIME PARTIAL: CPT

## 2023-03-01 PROCEDURE — 84443 ASSAY THYROID STIM HORMONE: CPT

## 2023-03-01 PROCEDURE — A9500: CPT

## 2023-03-01 PROCEDURE — U0005: CPT

## 2023-03-01 PROCEDURE — 71045 X-RAY EXAM CHEST 1 VIEW: CPT | Mod: 26

## 2023-03-01 PROCEDURE — U0003: CPT

## 2023-03-01 PROCEDURE — 99223 1ST HOSP IP/OBS HIGH 75: CPT

## 2023-03-01 PROCEDURE — 93010 ELECTROCARDIOGRAM REPORT: CPT

## 2023-03-01 PROCEDURE — 80053 COMPREHEN METABOLIC PANEL: CPT

## 2023-03-01 PROCEDURE — 36415 COLL VENOUS BLD VENIPUNCTURE: CPT

## 2023-03-01 PROCEDURE — 93306 TTE W/DOPPLER COMPLETE: CPT

## 2023-03-01 PROCEDURE — 84484 ASSAY OF TROPONIN QUANT: CPT

## 2023-03-01 PROCEDURE — 99291 CRITICAL CARE FIRST HOUR: CPT

## 2023-03-01 RX ORDER — DEXTROSE 50 % IN WATER 50 %
15 SYRINGE (ML) INTRAVENOUS ONCE
Refills: 0 | Status: DISCONTINUED | OUTPATIENT
Start: 2023-03-01 | End: 2023-03-04

## 2023-03-01 RX ORDER — ALBUTEROL 90 UG/1
1 AEROSOL, METERED ORAL
Refills: 0 | Status: DISCONTINUED | OUTPATIENT
Start: 2023-03-01 | End: 2023-03-04

## 2023-03-01 RX ORDER — GLUCAGON INJECTION, SOLUTION 0.5 MG/.1ML
1 INJECTION, SOLUTION SUBCUTANEOUS ONCE
Refills: 0 | Status: DISCONTINUED | OUTPATIENT
Start: 2023-03-01 | End: 2023-03-04

## 2023-03-01 RX ORDER — METOPROLOL TARTRATE 50 MG
25 TABLET ORAL DAILY
Refills: 0 | Status: DISCONTINUED | OUTPATIENT
Start: 2023-03-01 | End: 2023-03-02

## 2023-03-01 RX ORDER — LISINOPRIL 2.5 MG/1
20 TABLET ORAL DAILY
Refills: 0 | Status: DISCONTINUED | OUTPATIENT
Start: 2023-03-01 | End: 2023-03-01

## 2023-03-01 RX ORDER — LANOLIN ALCOHOL/MO/W.PET/CERES
3 CREAM (GRAM) TOPICAL AT BEDTIME
Refills: 0 | Status: DISCONTINUED | OUTPATIENT
Start: 2023-03-01 | End: 2023-03-04

## 2023-03-01 RX ORDER — SODIUM CHLORIDE 9 MG/ML
1000 INJECTION, SOLUTION INTRAVENOUS
Refills: 0 | Status: DISCONTINUED | OUTPATIENT
Start: 2023-03-01 | End: 2023-03-04

## 2023-03-01 RX ORDER — APIXABAN 2.5 MG/1
5 TABLET, FILM COATED ORAL EVERY 12 HOURS
Refills: 0 | Status: DISCONTINUED | OUTPATIENT
Start: 2023-03-01 | End: 2023-03-04

## 2023-03-01 RX ORDER — FUROSEMIDE 40 MG
20 TABLET ORAL ONCE
Refills: 0 | Status: COMPLETED | OUTPATIENT
Start: 2023-03-01 | End: 2023-03-01

## 2023-03-01 RX ORDER — INSULIN LISPRO 100/ML
VIAL (ML) SUBCUTANEOUS AT BEDTIME
Refills: 0 | Status: DISCONTINUED | OUTPATIENT
Start: 2023-03-01 | End: 2023-03-04

## 2023-03-01 RX ORDER — INSULIN LISPRO 100/ML
VIAL (ML) SUBCUTANEOUS
Refills: 0 | Status: DISCONTINUED | OUTPATIENT
Start: 2023-03-01 | End: 2023-03-04

## 2023-03-01 RX ORDER — DILTIAZEM HCL 120 MG
20 CAPSULE, EXT RELEASE 24 HR ORAL ONCE
Refills: 0 | Status: COMPLETED | OUTPATIENT
Start: 2023-03-01 | End: 2023-03-01

## 2023-03-01 RX ORDER — SODIUM CHLORIDE 9 MG/ML
1000 INJECTION, SOLUTION INTRAVENOUS ONCE
Refills: 0 | Status: COMPLETED | OUTPATIENT
Start: 2023-03-01 | End: 2023-03-01

## 2023-03-01 RX ORDER — METOPROLOL TARTRATE 50 MG
5 TABLET ORAL ONCE
Refills: 0 | Status: COMPLETED | OUTPATIENT
Start: 2023-03-01 | End: 2023-03-01

## 2023-03-01 RX ORDER — ACETAMINOPHEN 500 MG
650 TABLET ORAL EVERY 6 HOURS
Refills: 0 | Status: DISCONTINUED | OUTPATIENT
Start: 2023-03-01 | End: 2023-03-04

## 2023-03-01 RX ORDER — DEXTROSE 50 % IN WATER 50 %
25 SYRINGE (ML) INTRAVENOUS ONCE
Refills: 0 | Status: DISCONTINUED | OUTPATIENT
Start: 2023-03-01 | End: 2023-03-04

## 2023-03-01 RX ORDER — ACETAMINOPHEN 500 MG
975 TABLET ORAL ONCE
Refills: 0 | Status: COMPLETED | OUTPATIENT
Start: 2023-03-01 | End: 2023-03-01

## 2023-03-01 RX ORDER — PANTOPRAZOLE SODIUM 20 MG/1
40 TABLET, DELAYED RELEASE ORAL
Refills: 0 | Status: DISCONTINUED | OUTPATIENT
Start: 2023-03-01 | End: 2023-03-04

## 2023-03-01 RX ORDER — DEXTROSE 50 % IN WATER 50 %
12.5 SYRINGE (ML) INTRAVENOUS ONCE
Refills: 0 | Status: DISCONTINUED | OUTPATIENT
Start: 2023-03-01 | End: 2023-03-04

## 2023-03-01 RX ORDER — ONDANSETRON 8 MG/1
4 TABLET, FILM COATED ORAL EVERY 8 HOURS
Refills: 0 | Status: DISCONTINUED | OUTPATIENT
Start: 2023-03-01 | End: 2023-03-04

## 2023-03-01 RX ORDER — ATORVASTATIN CALCIUM 80 MG/1
40 TABLET, FILM COATED ORAL AT BEDTIME
Refills: 0 | Status: DISCONTINUED | OUTPATIENT
Start: 2023-03-01 | End: 2023-03-04

## 2023-03-01 RX ADMIN — Medication 25 MILLIGRAM(S): at 08:24

## 2023-03-01 RX ADMIN — ATORVASTATIN CALCIUM 40 MILLIGRAM(S): 80 TABLET, FILM COATED ORAL at 21:46

## 2023-03-01 RX ADMIN — Medication 975 MILLIGRAM(S): at 08:24

## 2023-03-01 RX ADMIN — SODIUM CHLORIDE 1000 MILLILITER(S): 9 INJECTION, SOLUTION INTRAVENOUS at 08:29

## 2023-03-01 RX ADMIN — Medication 20 MILLIGRAM(S): at 18:54

## 2023-03-01 RX ADMIN — Medication 975 MILLIGRAM(S): at 09:05

## 2023-03-01 RX ADMIN — Medication 5 MILLIGRAM(S): at 08:24

## 2023-03-01 RX ADMIN — APIXABAN 5 MILLIGRAM(S): 2.5 TABLET, FILM COATED ORAL at 21:46

## 2023-03-01 RX ADMIN — Medication 20 MILLIGRAM(S): at 12:13

## 2023-03-01 RX ADMIN — SODIUM CHLORIDE 1000 MILLILITER(S): 9 INJECTION, SOLUTION INTRAVENOUS at 09:05

## 2023-03-01 RX ADMIN — Medication 20 MILLIGRAM(S): at 12:14

## 2023-03-01 NOTE — ED PROVIDER NOTE - CLINICAL SUMMARY MEDICAL DECISION MAKING FREE TEXT BOX
Throughout ED observation period, pt remained clinically and stable.  HR improved w/ iv/po BB  case d/w sicat who rec admit to med [facility] for further w/u

## 2023-03-01 NOTE — H&P ADULT - ATTENDING COMMENTS
Pt is 57-year-old-male with a history significant for Afib on eliquis (follows Dr. Zavala), HTN, HLD, DM, and pectus excavatum (s/p surgical repair 40 years ago), who presents to the ED after feeling palpitations. Patient reports feeling palpitations for the last month as well as positional orthopnea, whereby he sleep with 2 pillows and has his matress on incline because he gets short of breath if he lays flat. Pt denies ever having experienced CP or pleuritic pain, does report feeling R ankle pain which he attributes to his varicose veins. No pitting edema or signs of volume overload.    In the ED, pt was found to be in afib RVR with 's, that resolved with IV metoprolol 5 in the AM,  metoprolol succinate was given as pt did not take his AM medications at the time. Diltiazem IV was given for another episode of 's.  Labs significant for Troponin that was hemolyzed, mild transaminitis (AST/ALT; alk p normal); Echo in 11/2019 showed EF 49% CXR showed b/l reticulonodular opacities with sharp costovertebral angles,   Pt remained HD stable, Dr. Zavala was informed about the admission. Pt will be admitted for further management of afib w/ rvr.    #Paroxsymal afib w/ RVR  - 's-140's, ekg shows afib; pt on telemetry  - echo 11/2019 showed EF 49%, pt has positional orthopnea  - CXR prelim read shows b/l reticulonodular opacities, no pleural effusions  - s/p x1 lasix IV 20  - c/w eliquis, metoprolol succ  - s/p x1 Lopressor IV 5mg  - adminster x1 Diltiazem IV 20mg  - f/u Sicat recommendatoins  - f/u 4pm trops, TSH, and labs, keep mg >2 k>4  - f/u TTE  -check LE venous duplex    #RUQ abd pain in s/o mild transaminitis, r/o cholecystitis  - ast/alt elevated, alk p normal, RUQ abd TTP  - f/u RUQ US    #HTN  - c/w home metoprolol succ  - hold lisinopril in s/o hyperkalemia 5.2    #Hyperkalemia  - f/u 4pm CMP    #HLD  - c/w home atorvastatin 40    #DM  - SS for now    # DVT prophylaxis: eliquis     # GI prophylaxis: pantoprazole    # Diet: carb consistent diet    # Disposition: telemetry    # Handoff  - f/u echo, trops, cards recs, repeat EKG  - monitor for RVR    Dr. Jordan York  Attending Hospitalist

## 2023-03-01 NOTE — CONSULT NOTE ADULT - ASSESSMENT
1] Atrial Fibrillation  - Continue OAC with Eliquis  - Increase Metoprolol Succinate to 37.5 mg tablet daily for rate control  - Will review recent echo done in office.    2] HTN  - Continue to monitor    3] Exertional Shortness of Breath  - Will review recent echo done at office  - Consider pharmacological nuclear stress test (adenosine)    4] Type II DM  - Continue glycemic control  - Recently started on Jardiance by his endocrinologist    5] Hyperlipidemia  - On statin therapy    Plan discuss with ER Staff at bedside  Will follow    Gonzalez Zavala MD  [phone] Office

## 2023-03-01 NOTE — H&P ADULT - ASSESSMENT
Pt is 57-year-old-male with a history significant for Afib on eliquis (follows Dr. Zavala), HTN, HLD, DM, and pectus excavatum (s/p surgical repair 40 years ago), who presents to the ED after feeling palpitations. Patient reports feeling palpitations for the last month as well as positional orthopnea, whereby he sleep with 2 pillows and has his matress on incline because he gets short of breath if he lays flat. Pt denies ever having experienced CP or pleuritic pain, does report feeling R ankle pain which he attributes to his varicose veins. No pitting edema or signs of volume overload.    In the ED, pt was found to be in afib RVR with 's, that resolved with IV metoprolol 5 in the AM,  metoprolol succinate was given as pt did not take his AM medications at the time. Diltiazem IV was given for another episode of 's.  Labs significant for Troponin that was hemolyzed, mild transaminitis (AST/ALT; alk p normal); Echo in 11/2019 showed EF 49% CXR showed b/l reticulonodular opacities with sharp costovertebral angles,   Pt remained HD stable, Dr. Zavala was informed about the admission. Pt will be admitted for further management of afib w/ rvr.    #afib w/ RVR  - 's-140's, ekg shows afib; pt on telemetry  - echo 11/2019 showed EF 49%, pt has positional orthopnea  - CXR prelim read shows b/l reticulonodular opacities, no pleural effusions  - s/p x1 lasix IV 20  - c/w eliquis, metoprolol succ  - s/p x1 Lopressor IV 5mg  - adminster x1 Diltiazem IV 20mg  - f/u Sicat recommendatoins  - f/u 4pm trops, TSH, and labs, keep mg >2 k>4  - f/u TTE    #RUQ abd pain in s/o mild transaminitis, r/o cholecystitis  - ast/alt elevated, alk p normal, RUQ abd TTP  - f/u RUQ US    #HTN  - c/w home metoprolol succ and lisinopril    #HLD  - c/w home atorvastatin 40    #DM  - SS for now    # DVT prophylaxis: eliquis     # GI prophylaxis: pantoprazole    # Diet: carb consis    # Disposition: telemetry                                                                             --------------------------------------------------------    # Handoff  - f/u echo, trops, cards recs, repeat EKG  - monitor for RVR     Pt is 57-year-old-male with a history significant for Afib on eliquis (follows Dr. Zavala), HTN, HLD, DM, and pectus excavatum (s/p surgical repair 40 years ago), who presents to the ED after feeling palpitations. Patient reports feeling palpitations for the last month as well as positional orthopnea, whereby he sleep with 2 pillows and has his matress on incline because he gets short of breath if he lays flat. Pt denies ever having experienced CP or pleuritic pain, does report feeling R ankle pain which he attributes to his varicose veins. No pitting edema or signs of volume overload.    In the ED, pt was found to be in afib RVR with 's, that resolved with IV metoprolol 5 in the AM,  metoprolol succinate was given as pt did not take his AM medications at the time. Diltiazem IV was given for another episode of 's.  Labs significant for Troponin that was hemolyzed, mild transaminitis (AST/ALT; alk p normal); Echo in 11/2019 showed EF 49% CXR showed b/l reticulonodular opacities with sharp costovertebral angles,   Pt remained HD stable, Dr. Zavala was informed about the admission. Pt will be admitted for further management of afib w/ rvr.    #afib w/ RVR  - 's-140's, ekg shows afib; pt on telemetry  - echo 11/2019 showed EF 49%, pt has positional orthopnea  - CXR prelim read shows b/l reticulonodular opacities, no pleural effusions  - s/p x1 lasix IV 20  - c/w eliquis, metoprolol succ  - s/p x1 Lopressor IV 5mg  - adminster x1 Diltiazem IV 20mg  - f/u Sicat recommendatoins  - f/u 4pm trops, TSH, and labs, keep mg >2 k>4  - f/u TTE    #RUQ abd pain in s/o mild transaminitis, r/o cholecystitis  - ast/alt elevated, alk p normal, RUQ abd TTP  - f/u RUQ US    #HTN  - c/w home metoprolol succ  - hold lisinopril in s/o hyperkalemia 5.2    #Hyperkalemia  - f/u 4pm CMP    #HLD  - c/w home atorvastatin 40    #DM  - SS for now    # DVT prophylaxis: eliquis     # GI prophylaxis: pantoprazole    # Diet: carb consis    # Disposition: telemetry                                                                             --------------------------------------------------------    # Handoff  - f/u echo, trops, cards recs, repeat EKG  - monitor for RVR     Pt is 57-year-old-male with a history significant for Afib on eliquis (follows Dr. Zavala), HTN, HLD, DM, and pectus excavatum (s/p surgical repair 40 years ago), who presents to the ED after feeling palpitations. Patient reports feeling palpitations for the last month as well as positional orthopnea, whereby he sleep with 2 pillows and has his matress on incline because he gets short of breath if he lays flat. Pt denies ever having experienced CP or pleuritic pain, does report feeling R ankle pain which he attributes to his varicose veins. No pitting edema or signs of volume overload.    In the ED, pt was found to be in afib RVR with 's, that resolved with IV metoprolol 5 in the AM,  metoprolol succinate was given as pt did not take his AM medications at the time. Diltiazem IV was given for another episode of 's.  Labs significant for Troponin that was hemolyzed, mild transaminitis (AST/ALT; alk p normal); Echo in 11/2019 showed EF 49% CXR showed b/l reticulonodular opacities with sharp costovertebral angles,   Pt remained HD stable, Dr. Zavala was informed about the admission. Pt will be admitted for further management of afib w/ rvr.    paroxsymal afib w/ RVR  - 's-140's, ekg shows afib; pt on telemetry  - echo 11/2019 showed EF 49%, pt has positional orthopnea  - CXR prelim read shows b/l reticulonodular opacities, no pleural effusions  - s/p x1 lasix IV 20  - c/w eliquis, metoprolol succ  - s/p x1 Lopressor IV 5mg  - adminster x1 Diltiazem IV 20mg  - f/u Sicat recommendatoins  - f/u 4pm trops, TSH, and labs, keep mg >2 k>4  - f/u TTE    #RUQ abd pain in s/o mild transaminitis, r/o cholecystitis  - ast/alt elevated, alk p normal, RUQ abd TTP  - f/u RUQ US    #HTN  - c/w home metoprolol succ  - hold lisinopril in s/o hyperkalemia 5.2    #Hyperkalemia  - f/u 4pm CMP    #HLD  - c/w home atorvastatin 40    #DM  - SS for now    # DVT prophylaxis: eliquis     # GI prophylaxis: pantoprazole    # Diet: carb consis    # Disposition: telemetry                                                                             --------------------------------------------------------    # Handoff  - f/u echo, trops, cards recs, repeat EKG  - monitor for RVR     Pt is 57-year-old-male with a history significant for Afib on eliquis (follows Dr. Zavala), HTN, HLD, DM, and pectus excavatum (s/p surgical repair 40 years ago), who presents to the ED after feeling palpitations. Patient reports feeling palpitations for the last month as well as positional orthopnea, whereby he sleep with 2 pillows and has his matress on incline because he gets short of breath if he lays flat. Pt denies ever having experienced CP or pleuritic pain, does report feeling R ankle pain which he attributes to his varicose veins. No pitting edema or signs of volume overload.    In the ED, pt was found to be in afib RVR with 's, that resolved with IV metoprolol 5 in the AM,  metoprolol succinate was given as pt did not take his AM medications at the time. Diltiazem IV was given for another episode of 's.  Labs significant for Troponin that was hemolyzed, mild transaminitis (AST/ALT; alk p normal); Echo in 11/2019 showed EF 49% CXR showed b/l reticulonodular opacities with sharp costovertebral angles,   Pt remained HD stable, Dr. Zavala was informed about the admission. Pt will be admitted for further management of afib w/ rvr.    #Paroxsymal afib w/ RVR  - 's-140's, ekg shows afib; pt on telemetry  - echo 11/2019 showed EF 49%, pt has positional orthopnea  - CXR prelim read shows b/l reticulonodular opacities, no pleural effusions  - s/p x1 lasix IV 20  - c/w eliquis, metoprolol succ  - s/p x1 Lopressor IV 5mg  - adminster x1 Diltiazem IV 20mg  - f/u Sicat recommendatoins  - f/u 4pm trops, TSH, and labs, keep mg >2 k>4  - f/u TTE  -check LE venous duplex    #RUQ abd pain in s/o mild transaminitis, r/o cholecystitis  - ast/alt elevated, alk p normal, RUQ abd TTP  - f/u RUQ US    #HTN  - c/w home metoprolol succ  - hold lisinopril in s/o hyperkalemia 5.2    #Hyperkalemia  - f/u 4pm CMP    #HLD  - c/w home atorvastatin 40    #DM  - SS for now    # DVT prophylaxis: eliquis     # GI prophylaxis: pantoprazole    # Diet: carb consistent diet    # Disposition: telemetry    # Handoff  - f/u echo, trops, cards recs, repeat EKG  - monitor for RVR    Dr. Jordan York  Attending Hospitalist

## 2023-03-01 NOTE — H&P ADULT - HISTORY OF PRESENT ILLNESS
Pt is 57-year-old-male with a history significant for Afib on eliquis (follows Dr. Zavala), HTN, HLD, DM, and pectus excavatum (s/p surgical repair 40 years ago), who presents to the ED after feeling palpitations. Patient reports feeling palpitations for the last month as well as positional orthopnea, whereby he sleep with 2 pillows and has his matress on incline because he gets short of breath if he lays flat. Pt denies ever having experienced CP or pleuritic pain, does report feeling R ankle pain which he attributes to his varicose veins. Of note, pt also reports mild abdominal pain.    In the ED, pt was found to be in afib RVR with 's, that resolved with IV metoprolol, metoprolol succinate was given as pt did not take his AM medications at the time. Labs significant for Troponin that was hemolyzed, mild transaminitis (AST/ALT; alk p normal); Echo in 11/2019 showed EF 49% CXR showed b/l reticulonodular opacities with sharp costovertebral angles,   Pt remained HD stable, Dr. Zavala was informed about the admission. Pt will be admitted for further management of afib w/ rvr. Pt is 57-year-old-male with a history significant for Afib on eliquis (follows Dr. Zavala), HTN, HLD, DM, and pectus excavatum (s/p surgical repair 40 years ago), who presents to the ED after feeling palpitations. Patient reports feeling palpitations for the last month as well as positional orthopnea, whereby he sleep with 2 pillows and has his matress on incline because he gets short of breath if he lays flat. Pt denies ever having experienced CP or pleuritic pain, does report feeling R ankle pain which he attributes to his varicose veins. No pitting edema or signs of volume overload.    In the ED, pt was found to be in afib RVR with 's, that resolved with IV metoprolol 5 in the AM,  metoprolol succinate was given as pt did not take his AM medications at the time. Diltiazem IV was given for another episode of 's.  Labs significant for Troponin that was hemolyzed, mild transaminitis (AST/ALT; alk p normal); Echo in 11/2019 showed EF 49% CXR showed b/l reticulonodular opacities with sharp costovertebral angles,   Pt remained HD stable, Dr. Zavala was informed about the admission. Pt will be admitted for further management of afib w/ rvr.

## 2023-03-01 NOTE — ED PROVIDER NOTE - CRITICAL CARE ATTENDING CONTRIBUTION TO CARE
Attending Statement: I have personally performed a face to face diagnostic evaluation on this patient. I have reviewed the ACP note and agree with the history, exam and plan of care, except as noted.    56 y/o male with PMHx of HTN, DM, and afib on eliquis presents for feeling of afib.    pt states for the past 1-1.5 months he has felt his HR go rapid. + palpitations and occasional chest pain associated w/ rapid heart beat. no syncope. pt w/ chronic LE edema- unchanged. pt reports med compliant but has not taken meds today.  no current co, sob, fever, chills, cough, st, ap, n,v,d. no reported trauma    vs rapid/irreg  gen- NAD, aaox3  card-irir   lungs-ctab, no wheezing or rhonchi  abd-sntnd, no guarding or rebound  neuro- full str/sensation, cn ii-xii grossly intact, normal coordination  LE- 1+ b/l LE edema

## 2023-03-01 NOTE — ED PROVIDER NOTE - PROGRESS NOTE DETAILS
TRINI - Discussed pt with Dr Pedro, continue workup as planned and will follow. KA - Dr Pedro recommends stress test, will admit

## 2023-03-01 NOTE — ED PROVIDER NOTE - PHYSICAL EXAMINATION
As Follows:  CONST: Well appearing in NAD.  EYES: EOMI, Sclera and conjunctiva clear.   CARD: Normal S1 S2; Tachycardic with afib   RESP: Equal BS B/L, No wheezes, rhonchi or rales. No distress  GI: Soft, non-tender, non-distended.  SKIN: Warm, dry, no acute rashes. Good turgor  NEURO: A&Ox3, No focal deficits. Strength and sensation intact. As Follows:  CONST: Well appearing in NAD.  EYES: EOMI, Sclera and conjunctiva clear.   CARD: Normal S1 S2; Tachycardic with afib   RESP: Equal BS B/L, No wheezes, rhonchi or rales. No distress  GI: Soft, non-tender, non-distended.  MSK: Mild pitting edema to b/l lower extremities.   SKIN: Warm, dry, no acute rashes. Good turgor  NEURO: A&Ox3, No focal deficits. Strength and sensation intact.

## 2023-03-01 NOTE — H&P ADULT - NSHPPHYSICALEXAM_GEN_ALL_CORE
VITALS:   T(C): 36.1 (03-01-23 @ 10:55), Max: 37.2 (03-01-23 @ 07:45)  HR: 143 (03-01-23 @ 10:55) (103 - 143)  BP: 130/88 (03-01-23 @ 12:00) (100/101 - 200/97)  RR: 22 (03-01-23 @ 12:00) (16 - 22)  SpO2: 98% (03-01-23 @ 12:00) (96% - 98%)    GENERAL: NAD, lying in bed comfortably  HEAD:  Atraumatic, normocephalic  EYES: EOMI, PERRLA, conjunctiva and sclera clear  ENT: Moist mucous membranes  NECK: Supple, no JVD  HEART: Chest concavity present with vertical sternal scar. Irregular rate and rhythm, no murmurs, rubs, or gallops  LUNGS: Unlabored respirations.  Clear to auscultation bilaterally, no crackles, wheezing, or rhonchi  ABDOMEN: Soft, distended, mild RUQ tenderness  EXTREMITIES: 2+ peripheral pulses bilaterally. No clubbing, cyanosis, or edema  NERVOUS SYSTEM:  A&Ox3, no focal deficits   SKIN: No rashes or lesions

## 2023-03-01 NOTE — ED PROVIDER NOTE - OBJECTIVE STATEMENT
Pt is a 56 y/o male with PMHx of HTN, DM, and afib on eliquis presents for a "fiddling" heart for approx. 1 month. He states he was going to make an appointment today to see is cardio Dr Pedro but decided to come to the ED instead. He did not take his morning meds but is compliant. He states his when he lays on his left side he feels it aggravates his palpitations. He denies any current chest pain, SoB, fever, chills, cough, sore throat, N/V/D/C, or urinary complaints.     Cardio Dr Pedro Pt is a 56 y/o male with PMHx of HTN, DM, and afib on eliquis presents for a "fiddling" heart for approx. 1 month. He admits to palpitations. He states he was going to make an appointment today to see is cardio Dr Pedro but decided to come to the ED instead. He did not take his morning meds today but is compliant. When he lays on his left side he feels it aggravates his palpitations. He denies any current chest pain, SoB, fever, chills, cough, sore throat, N/V/D/C, or urinary complaints.     Cardio Dr Pedro

## 2023-03-01 NOTE — H&P ADULT - NSICDXPASTMEDICALHX_GEN_ALL_CORE_FT
PAST MEDICAL HISTORY:  Afib     HLD (hyperlipidemia)     HTN (hypertension)     Type 2 diabetes mellitus

## 2023-03-01 NOTE — H&P ADULT - NSHPLABSRESULTS_GEN_ALL_CORE
15.3   10.96 )-----------( 250      ( 01 Mar 2023 07:44 )             45.4       03-01    136  |  105  |  13  ----------------------------<  254<H>  5.2<H>   |  19  |  0.9    Ca    8.4      01 Mar 2023 07:44    TPro  6.5  /  Alb  3.9  /  TBili  0.8  /  DBili  x   /  AST  58<H>  /  ALT  93<H>  /  AlkPhos  64  03-01                  PT/INR - ( 01 Mar 2023 07:44 )   PT: 16.90 sec;   INR: 1.47 ratio         PTT - ( 01 Mar 2023 07:44 )  PTT:55.0 sec    CARDIAC MARKERS ( 01 Mar 2023 07:44 )  x     / <0.01 ng/mL / x     / x     / x            CAPILLARY BLOOD GLUCOSE      POCT Blood Glucose.: 221 mg/dL (01 Mar 2023 07:56)

## 2023-03-01 NOTE — H&P ADULT - NSHPSOCIALHISTORY_GEN_ALL_CORE
Lives at home with wife; on disability for multiple years due to back injury. Denies recreational drug use.

## 2023-03-01 NOTE — CONSULT NOTE ADULT - SUBJECTIVE AND OBJECTIVE BOX
Full consult to follow.  Will review office notes    Patient was seen and examined earlier this morning in Main ER Critical Care Room by me.  EMR reviewed.        Physical Exam  Not in distress  Alert, oriented x 3  No JVD; irregular rhythm; nl S1S2  Bilateral breath sounds  Pectus excavatum present  Abdomen soft  No edema  Moving all extrmities    ROS: as stated in HPI.  Complains of exertional shortness of breath.    Labs: noted      < from: Xray Chest 2 Views PA/Lat (03.10.21 @ 11:44) >  Findings:    Support devices: None.    Cardiac/mediastinum/hilum: Unremarkable.    Lung parenchyma/Pleura: No lobar consolidations, pleural effusions or pneumonia. No pneumothorax.    Skeleton/soft tissues: Pectus excavatum deformity.    Impression:    No radiographic evidence of acute cardiopulmonary disease.    < end of copied text >    ____________________________________________________        Patient is a 57y old  Male who presents with a chief complaint of afib w/ RVR (01 Mar 2023 11:35)      REASON FOR CONSULT     HPI:  Pt is 57-year-old-male with a history significant for Afib on eliquis (follows Dr. Zavala), HTN, HLD, DM, and pectus excavatum (s/p surgical repair 40 years ago), who presents to the ED after feeling palpitations. Patient reports feeling palpitations for the last month as well as positional orthopnea, whereby he sleep with 2 pillows and has his matress on incline because he gets short of breath if he lays flat. Pt denies ever having experienced CP or pleuritic pain, does report feeling R ankle pain which he attributes to his varicose veins. No pitting edema or signs of volume overload.    In the ED, pt was found to be in afib RVR with 's, that resolved with IV metoprolol 5 in the AM,  metoprolol succinate was given as pt did not take his AM medications at the time. Diltiazem IV was given for another episode of 's.  Labs significant for Troponin that was hemolyzed, mild transaminitis (AST/ALT; alk p normal); Echo in 11/2019 showed EF 49% CXR showed b/l reticulonodular opacities with sharp costovertebral angles,   Pt remained HD stable, Dr. Zavala was informed about the admission. Pt will be admitted for further management of afib w/ rvr. (01 Mar 2023 11:35)      PAST MEDICAL & SURGICAL HISTORY:  Afib      HTN (hypertension)  HLD (hyperlipidemia)  Type 2 diabetes mellitus  Pectus excavatum              SOCIAL HISTORY:     FAMILY HISTORY:    No Known Allergies      MEDICATIONS  (STANDING):  apixaban 5 milliGRAM(s) Oral every 12 hours  atorvastatin 40 milliGRAM(s) Oral at bedtime  dextrose 5%. 1000 milliLiter(s) (50 mL/Hr) IV Continuous <Continuous>  dextrose 5%. 1000 milliLiter(s) (100 mL/Hr) IV Continuous <Continuous>  dextrose 50% Injectable 25 Gram(s) IV Push once  dextrose 50% Injectable 12.5 Gram(s) IV Push once  dextrose 50% Injectable 25 Gram(s) IV Push once  glucagon  Injectable 1 milliGRAM(s) IntraMuscular once  insulin lispro (ADMELOG) corrective regimen sliding scale   SubCutaneous three times a day before meals  insulin lispro (ADMELOG) corrective regimen sliding scale   SubCutaneous at bedtime  metoprolol succinate ER 25 milliGRAM(s) Oral daily  pantoprazole    Tablet 40 milliGRAM(s) Oral before breakfast    MEDICATIONS  (PRN):  acetaminophen     Tablet .. 650 milliGRAM(s) Oral every 6 hours PRN Temp greater or equal to 38C (100.4F), Mild Pain (1 - 3)  albuterol    90 MICROgram(s) HFA Inhaler 1 Puff(s) Inhalation two times a day PRN Shortness of Breath and/or Wheezing  aluminum hydroxide/magnesium hydroxide/simethicone Suspension 30 milliLiter(s) Oral every 4 hours PRN Dyspepsia  dextrose Oral Gel 15 Gram(s) Oral once PRN Blood Glucose LESS THAN 70 milliGRAM(s)/deciliter  melatonin 3 milliGRAM(s) Oral at bedtime PRN Insomnia  ondansetron Injectable 4 milliGRAM(s) IV Push every 8 hours PRN Nausea and/or Vomiting      Vital Signs Last 24 Hrs  T(C): 36.1 (01 Mar 2023 10:55), Max: 37.2 (01 Mar 2023 07:45)  T(F): 97 (01 Mar 2023 10:55), Max: 99 (01 Mar 2023 07:45)  HR: 80 (01 Mar 2023 12:30) (80 - 143)  BP: 103/76 (01 Mar 2023 12:30) (100/101 - 200/97)  BP(mean): 87 (01 Mar 2023 12:30) (87 - 113)  RR: 18 (01 Mar 2023 12:30) (16 - 22)  SpO2: 98% (01 Mar 2023 12:30) (96% - 98%)    Parameters below as of 01 Mar 2023 12:30  Patient On (Oxygen Delivery Method): room air     I&O's Detail        LABS:                        15.3   10.96 )-----------( 250      ( 01 Mar 2023 07:44 )             45.4     03-01    136  |  105  |  13  ----------------------------<  254<H>  5.2<H>   |  19  |  0.9    Ca    8.4      01 Mar 2023 07:44    TPro  6.5  /  Alb  3.9  /  TBili  0.8  /  DBili  x   /  AST  58<H>  /  ALT  93<H>  /  AlkPhos  64  03-01    CARDIAC MARKERS ( 01 Mar 2023 07:44 )  x     / <0.01 ng/mL / x     / x     / x          PT/INR - ( 01 Mar 2023 07:44 )   PT: 16.90 sec;   INR: 1.47 ratio         PTT - ( 01 Mar 2023 07:44 )  PTT:55.0 sec  I&O's Summary    BNPSerum Pro-Brain Natriuretic Peptide: 990 pg/mL (03-01 @ 08:55)         Patient was seen and examined earlier this morning in Main ER Critical Care Room by me.  EMR reviewed.    Mr. Arie Apple is [id number] male with a past medical history of Hypertension, Atrial Fibrillation, Pectus Excavatum S/P Surgery, Hyperlipidemia, Type II DM, Covid-19,  DJD of Cervical Spine/LS Spine, Depression and Obesity.    He presented at [facility] because of palpitations.  He admits to have exertional shortness of breath.  He was last seen in office on February 14, 2023 because of his complaint of exertional shortness of breath.    He has an upcoming appointment for a pharmacological nuclear stress test.    In Main ER, he is comfortable in stretcher.  He denies any chest pain.  He continues to complain of intermittent palpitations.    Physical Exam  Not in distress  Alert, oriented x 3  No JVD; irregular rhythm; nl S1S2  Bilateral breath sounds  Pectus excavatum present  Abdomen soft  No edema  Moving all extrmities    ROS: as stated in HPI.  Complains of exertional shortness of breath.    Labs: noted      < from: Xray Chest 2 Views PA/Lat (03.10.21 @ 11:44) >  Findings:    Support devices: None.    Cardiac/mediastinum/hilum: Unremarkable.    Lung parenchyma/Pleura: No lobar consolidations, pleural effusions or pneumonia. No pneumothorax.    Skeleton/soft tissues: Pectus excavatum deformity.    Impression:    No radiographic evidence of acute cardiopulmonary disease.    < end of copied text >    ____________________________________________________        Patient is a 57y old  Male who presents with a chief complaint of afib w/ RVR (01 Mar 2023 11:35)      REASON FOR CONSULT     HPI:  Pt is 57-year-old-male with a history significant for Afib on eliquis (follows Dr. Zavala), HTN, HLD, DM, and pectus excavatum (s/p surgical repair 40 years ago), who presents to the ED after feeling palpitations. Patient reports feeling palpitations for the last month as well as positional orthopnea, whereby he sleep with 2 pillows and has his matress on incline because he gets short of breath if he lays flat. Pt denies ever having experienced CP or pleuritic pain, does report feeling R ankle pain which he attributes to his varicose veins. No pitting edema or signs of volume overload.    In the ED, pt was found to be in afib RVR with 's, that resolved with IV metoprolol 5 in the AM,  metoprolol succinate was given as pt did not take his AM medications at the time. Diltiazem IV was given for another episode of 's.  Labs significant for Troponin that was hemolyzed, mild transaminitis (AST/ALT; alk p normal); Echo in 11/2019 showed EF 49% CXR showed b/l reticulonodular opacities with sharp costovertebral angles,   Pt remained HD stable, Dr. Zavala was informed about the admission. Pt will be admitted for further management of afib w/ rvr. (01 Mar 2023 11:35)      PAST MEDICAL & SURGICAL HISTORY:  Afib  HTN (hypertension)  HLD (hyperlipidemia)  Type 2 diabetes mellitus  Pectus excavatum      SOCIAL HISTORY:     FAMILY HISTORY:    No Known Allergies      MEDICATIONS  (STANDING):  apixaban 5 milliGRAM(s) Oral every 12 hours  atorvastatin 40 milliGRAM(s) Oral at bedtime  dextrose 5%. 1000 milliLiter(s) (50 mL/Hr) IV Continuous <Continuous>  dextrose 5%. 1000 milliLiter(s) (100 mL/Hr) IV Continuous <Continuous>  dextrose 50% Injectable 25 Gram(s) IV Push once  dextrose 50% Injectable 12.5 Gram(s) IV Push once  dextrose 50% Injectable 25 Gram(s) IV Push once  glucagon  Injectable 1 milliGRAM(s) IntraMuscular once  insulin lispro (ADMELOG) corrective regimen sliding scale   SubCutaneous three times a day before meals  insulin lispro (ADMELOG) corrective regimen sliding scale   SubCutaneous at bedtime  metoprolol succinate ER 25 milliGRAM(s) Oral daily  pantoprazole    Tablet 40 milliGRAM(s) Oral before breakfast    MEDICATIONS  (PRN):  acetaminophen     Tablet .. 650 milliGRAM(s) Oral every 6 hours PRN Temp greater or equal to 38C (100.4F), Mild Pain (1 - 3)  albuterol    90 MICROgram(s) HFA Inhaler 1 Puff(s) Inhalation two times a day PRN Shortness of Breath and/or Wheezing  aluminum hydroxide/magnesium hydroxide/simethicone Suspension 30 milliLiter(s) Oral every 4 hours PRN Dyspepsia  dextrose Oral Gel 15 Gram(s) Oral once PRN Blood Glucose LESS THAN 70 milliGRAM(s)/deciliter  melatonin 3 milliGRAM(s) Oral at bedtime PRN Insomnia  ondansetron Injectable 4 milliGRAM(s) IV Push every 8 hours PRN Nausea and/or Vomiting      Vital Signs Last 24 Hrs  T(C): 36.1 (01 Mar 2023 10:55), Max: 37.2 (01 Mar 2023 07:45)  T(F): 97 (01 Mar 2023 10:55), Max: 99 (01 Mar 2023 07:45)  HR: 80 (01 Mar 2023 12:30) (80 - 143)  BP: 103/76 (01 Mar 2023 12:30) (100/101 - 200/97)  BP(mean): 87 (01 Mar 2023 12:30) (87 - 113)  RR: 18 (01 Mar 2023 12:30) (16 - 22)  SpO2: 98% (01 Mar 2023 12:30) (96% - 98%)    Parameters below as of 01 Mar 2023 12:30  Patient On (Oxygen Delivery Method): room air     I&O's Detail        LABS:                        15.3   10.96 )-----------( 250      ( 01 Mar 2023 07:44 )             45.4     03-01    136  |  105  |  13  ----------------------------<  254<H>  5.2<H>   |  19  |  0.9    Ca    8.4      01 Mar 2023 07:44    TPro  6.5  /  Alb  3.9  /  TBili  0.8  /  DBili  x   /  AST  58<H>  /  ALT  93<H>  /  AlkPhos  64  03-01    CARDIAC MARKERS ( 01 Mar 2023 07:44 )  x     / <0.01 ng/mL / x     / x     / x          PT/INR - ( 01 Mar 2023 07:44 )   PT: 16.90 sec;   INR: 1.47 ratio         PTT - ( 01 Mar 2023 07:44 )  PTT:55.0 sec  I&O's Summary    BNPSerum Pro-Brain Natriuretic Peptide: 990 pg/mL (03-01 @ 08:55)

## 2023-03-02 LAB
A1C WITH ESTIMATED AVERAGE GLUCOSE RESULT: 7.3 % — HIGH (ref 4–5.6)
ALBUMIN SERPL ELPH-MCNC: 4.1 G/DL — SIGNIFICANT CHANGE UP (ref 3.5–5.2)
ALP SERPL-CCNC: 73 U/L — SIGNIFICANT CHANGE UP (ref 30–115)
ALT FLD-CCNC: 101 U/L — HIGH (ref 0–41)
ANION GAP SERPL CALC-SCNC: 12 MMOL/L — SIGNIFICANT CHANGE UP (ref 7–14)
AST SERPL-CCNC: 33 U/L — SIGNIFICANT CHANGE UP (ref 0–41)
BASOPHILS # BLD AUTO: 0.1 K/UL — SIGNIFICANT CHANGE UP (ref 0–0.2)
BASOPHILS NFR BLD AUTO: 0.8 % — SIGNIFICANT CHANGE UP (ref 0–1)
BILIRUB SERPL-MCNC: 1 MG/DL — SIGNIFICANT CHANGE UP (ref 0.2–1.2)
BUN SERPL-MCNC: 11 MG/DL — SIGNIFICANT CHANGE UP (ref 10–20)
CALCIUM SERPL-MCNC: 9.3 MG/DL — SIGNIFICANT CHANGE UP (ref 8.4–10.4)
CHLORIDE SERPL-SCNC: 103 MMOL/L — SIGNIFICANT CHANGE UP (ref 98–110)
CO2 SERPL-SCNC: 25 MMOL/L — SIGNIFICANT CHANGE UP (ref 17–32)
CREAT SERPL-MCNC: 0.8 MG/DL — SIGNIFICANT CHANGE UP (ref 0.7–1.5)
EGFR: 104 ML/MIN/1.73M2 — SIGNIFICANT CHANGE UP
EOSINOPHIL # BLD AUTO: 0.14 K/UL — SIGNIFICANT CHANGE UP (ref 0–0.7)
EOSINOPHIL NFR BLD AUTO: 1.1 % — SIGNIFICANT CHANGE UP (ref 0–8)
ESTIMATED AVERAGE GLUCOSE: 163 MG/DL — HIGH (ref 68–114)
GLUCOSE BLDC GLUCOMTR-MCNC: 117 MG/DL — HIGH (ref 70–99)
GLUCOSE BLDC GLUCOMTR-MCNC: 166 MG/DL — HIGH (ref 70–99)
GLUCOSE BLDC GLUCOMTR-MCNC: 99 MG/DL — SIGNIFICANT CHANGE UP (ref 70–99)
GLUCOSE SERPL-MCNC: 101 MG/DL — HIGH (ref 70–99)
HCT VFR BLD CALC: 48.9 % — SIGNIFICANT CHANGE UP (ref 42–52)
HGB BLD-MCNC: 16.3 G/DL — SIGNIFICANT CHANGE UP (ref 14–18)
IMM GRANULOCYTES NFR BLD AUTO: 0.4 % — HIGH (ref 0.1–0.3)
LYMPHOCYTES # BLD AUTO: 26.9 % — SIGNIFICANT CHANGE UP (ref 20.5–51.1)
LYMPHOCYTES # BLD AUTO: 3.39 K/UL — SIGNIFICANT CHANGE UP (ref 1.2–3.4)
MAGNESIUM SERPL-MCNC: 2.1 MG/DL — SIGNIFICANT CHANGE UP (ref 1.8–2.4)
MCHC RBC-ENTMCNC: 27.3 PG — SIGNIFICANT CHANGE UP (ref 27–31)
MCHC RBC-ENTMCNC: 33.3 G/DL — SIGNIFICANT CHANGE UP (ref 32–37)
MCV RBC AUTO: 81.9 FL — SIGNIFICANT CHANGE UP (ref 80–94)
MONOCYTES # BLD AUTO: 0.9 K/UL — HIGH (ref 0.1–0.6)
MONOCYTES NFR BLD AUTO: 7.1 % — SIGNIFICANT CHANGE UP (ref 1.7–9.3)
NEUTROPHILS # BLD AUTO: 8.04 K/UL — HIGH (ref 1.4–6.5)
NEUTROPHILS NFR BLD AUTO: 63.7 % — SIGNIFICANT CHANGE UP (ref 42.2–75.2)
NRBC # BLD: 0 /100 WBCS — SIGNIFICANT CHANGE UP (ref 0–0)
PLATELET # BLD AUTO: 261 K/UL — SIGNIFICANT CHANGE UP (ref 130–400)
POTASSIUM SERPL-MCNC: 4.1 MMOL/L — SIGNIFICANT CHANGE UP (ref 3.5–5)
POTASSIUM SERPL-SCNC: 4.1 MMOL/L — SIGNIFICANT CHANGE UP (ref 3.5–5)
PROT SERPL-MCNC: 6.8 G/DL — SIGNIFICANT CHANGE UP (ref 6–8)
RBC # BLD: 5.97 M/UL — SIGNIFICANT CHANGE UP (ref 4.7–6.1)
RBC # FLD: 14.4 % — SIGNIFICANT CHANGE UP (ref 11.5–14.5)
SODIUM SERPL-SCNC: 140 MMOL/L — SIGNIFICANT CHANGE UP (ref 135–146)
TROPONIN T SERPL-MCNC: <0.01 NG/ML — SIGNIFICANT CHANGE UP
WBC # BLD: 12.62 K/UL — HIGH (ref 4.8–10.8)
WBC # FLD AUTO: 12.62 K/UL — HIGH (ref 4.8–10.8)

## 2023-03-02 PROCEDURE — 99232 SBSQ HOSP IP/OBS MODERATE 35: CPT

## 2023-03-02 RX ORDER — METOPROLOL TARTRATE 50 MG
37.5 TABLET ORAL DAILY
Refills: 0 | Status: DISCONTINUED | OUTPATIENT
Start: 2023-03-02 | End: 2023-03-02

## 2023-03-02 RX ORDER — ADENOSINE 3 MG/ML
60 INJECTION INTRAVENOUS ONCE
Refills: 0 | Status: DISCONTINUED | OUTPATIENT
Start: 2023-03-02 | End: 2023-03-04

## 2023-03-02 RX ORDER — METOPROLOL TARTRATE 50 MG
50 TABLET ORAL DAILY
Refills: 0 | Status: DISCONTINUED | OUTPATIENT
Start: 2023-03-03 | End: 2023-03-03

## 2023-03-02 RX ORDER — DILTIAZEM HCL 120 MG
10 CAPSULE, EXT RELEASE 24 HR ORAL ONCE
Refills: 0 | Status: COMPLETED | OUTPATIENT
Start: 2023-03-02 | End: 2023-03-02

## 2023-03-02 RX ORDER — METOPROLOL TARTRATE 50 MG
12.5 TABLET ORAL ONCE
Refills: 0 | Status: COMPLETED | OUTPATIENT
Start: 2023-03-02 | End: 2023-03-02

## 2023-03-02 RX ORDER — LISINOPRIL 2.5 MG/1
20 TABLET ORAL DAILY
Refills: 0 | Status: DISCONTINUED | OUTPATIENT
Start: 2023-03-03 | End: 2023-03-04

## 2023-03-02 RX ORDER — METOPROLOL TARTRATE 50 MG
50 TABLET ORAL ONCE
Refills: 0 | Status: COMPLETED | OUTPATIENT
Start: 2023-03-02 | End: 2023-03-02

## 2023-03-02 RX ORDER — METOPROLOL TARTRATE 50 MG
5 TABLET ORAL ONCE
Refills: 0 | Status: COMPLETED | OUTPATIENT
Start: 2023-03-02 | End: 2023-03-02

## 2023-03-02 RX ADMIN — ATORVASTATIN CALCIUM 40 MILLIGRAM(S): 80 TABLET, FILM COATED ORAL at 22:29

## 2023-03-02 RX ADMIN — APIXABAN 5 MILLIGRAM(S): 2.5 TABLET, FILM COATED ORAL at 17:28

## 2023-03-02 RX ADMIN — Medication 50 MILLIGRAM(S): at 19:46

## 2023-03-02 RX ADMIN — Medication 10 MILLIGRAM(S): at 22:30

## 2023-03-02 RX ADMIN — APIXABAN 5 MILLIGRAM(S): 2.5 TABLET, FILM COATED ORAL at 05:12

## 2023-03-02 RX ADMIN — Medication 10 MILLIGRAM(S): at 06:50

## 2023-03-02 RX ADMIN — Medication 25 MILLIGRAM(S): at 05:12

## 2023-03-02 RX ADMIN — Medication 5 MILLIGRAM(S): at 11:19

## 2023-03-02 RX ADMIN — Medication 1: at 10:27

## 2023-03-02 RX ADMIN — PANTOPRAZOLE SODIUM 40 MILLIGRAM(S): 20 TABLET, DELAYED RELEASE ORAL at 06:59

## 2023-03-02 RX ADMIN — Medication 12.5 MILLIGRAM(S): at 09:59

## 2023-03-02 NOTE — PROGRESS NOTE ADULT - ASSESSMENT
57-year-old-male with a history significant for Afib on eliquis (follows Dr. Zavala), HTN, HLD, DM, and pectus excavatum (s/p surgical repair 40 years ago), who presents to the ED after feeling palpitations    #Paroxysmal afib w RVR  - Increase metoprolol succinate to 50mg once a day  - Continue eliquis 5 BID  - f/u echo    #Dyspnea on exertion  - NST ordered    #HTN  - c/w home metoprolol succ  - Resume lisinopril 20mg qD2    #HLD  - c/w home atorvastatin 40    #DM  - SS for now    # DVT prophylaxis: eliquis     #Progress Note Handoff  Pending (specify): better HR control  Family discussion: d/w pt regarding tx for afib rvr  Disposition: Home
1] Atrial Fibrillation  - Continue OAC with Eliquis  - Give po Metoprolol succinate 50 mg tablet now.    - Dose of Metoprolol Succinate increased to 50 mg tablet daily    2] HTN  - Continue to monitor    3] Exertional Shortness of Breath  - Echo done in office showed LVEF >55%  - For completion of adenosine NST    4] Type II DM  - Continue glycemic control  - Recently started on Jardiance by his endocrinologist    5] Hyperlipidemia  - On statin therapy    Plan discuss with House Staff on call through Teams  Will follow    Gonzalez Zavala MD  [phone] Office

## 2023-03-02 NOTE — PROGRESS NOTE ADULT - SUBJECTIVE AND OBJECTIVE BOX
Vitals:  T(C): 37 (03-02-23 @ 07:53), Max: 37.2 (03-01-23 @ 07:45)  HR: 106 (03-02-23 @ 11:55) (80 - 150)  BP: 131/95 (03-02-23 @ 11:15) (100/101 - 200/97)  RR: 16 (03-02-23 @ 11:15) (16 - 22)  SpO2: 96% (03-02-23 @ 07:53) (95% - 99%)  ECG:    LABS:                        16.3   12.62 )-----------( 261      ( 02 Mar 2023 07:28 )             48.9     03-02    140  |  103  |  11  ----------------------------<  101<H>  4.1   |  25  |  0.8    Ca    9.3      02 Mar 2023 07:28  Mg     2.1     03-02    TPro  6.8  /  Alb  4.1  /  TBili  1.0  /  DBili  x   /  AST  33  /  ALT  101<H>  /  AlkPhos  73  03-02    PT/INR - ( 01 Mar 2023 16:25 )   PT: 15.50 sec;   INR: 1.35 ratio         PTT - ( 01 Mar 2023 16:25 )  PTT:37.4 sec  MEDICATIONS  (STANDING):  aDENosine Injectable (ADENOSCAN) 60 milliGRAM(s) IV Bolus once  apixaban 5 milliGRAM(s) Oral every 12 hours  atorvastatin 40 milliGRAM(s) Oral at bedtime  dextrose 5%. 1000 milliLiter(s) (50 mL/Hr) IV Continuous <Continuous>  dextrose 5%. 1000 milliLiter(s) (100 mL/Hr) IV Continuous <Continuous>  dextrose 50% Injectable 25 Gram(s) IV Push once  dextrose 50% Injectable 12.5 Gram(s) IV Push once  dextrose 50% Injectable 25 Gram(s) IV Push once  glucagon  Injectable 1 milliGRAM(s) IntraMuscular once  insulin lispro (ADMELOG) corrective regimen sliding scale   SubCutaneous three times a day before meals  insulin lispro (ADMELOG) corrective regimen sliding scale   SubCutaneous at bedtime  pantoprazole    Tablet 40 milliGRAM(s) Oral before breakfast    MEDICATIONS  (PRN):  acetaminophen     Tablet .. 650 milliGRAM(s) Oral every 6 hours PRN Temp greater or equal to 38C (100.4F), Mild Pain (1 - 3)  albuterol    90 MICROgram(s) HFA Inhaler 1 Puff(s) Inhalation two times a day PRN Shortness of Breath and/or Wheezing  aluminum hydroxide/magnesium hydroxide/simethicone Suspension 30 milliLiter(s) Oral every 4 hours PRN Dyspepsia  dextrose Oral Gel 15 Gram(s) Oral once PRN Blood Glucose LESS THAN 70 milliGRAM(s)/deciliter  melatonin 3 milliGRAM(s) Oral at bedtime PRN Insomnia  ondansetron Injectable 4 milliGRAM(s) IV Push every 8 hours PRN Nausea and/or Vomiting      PHYSICAL EXAM:  Constitutional: appears stated age, well developed/nourished, no acute distress  Eyes: EOM's intact.  PERRLA  ENMT: Normocephalic, atraumatic.  Clear oropharynx.  No ear discharge  Neck: Jugular veins non-distended; no carotid bruits bilaterally.  Respiratory: respiratory pattern unlabored; no dullness to percussion; lungs clear to auscultation bilaterally.  Cardiovascular: Irregular rhythm. Tachycardic.   S1 and S2 normal.  No murmur nor rub appreciated.  Abdomen: Soft, non-tender.  Normal bowel sounds.  Extremities: extremities warm; no cyanosis, clubbing or edema.  Pulses: Intact bilaterally  Skin: No gross abnormalities noted.  Musculoskeletal: No gross deformities  Neurological: Alert, oriented x 3.  No focal neurologic deficits noted.           Patient was seen and examined this evening by me in ED4.  EMR reviewed.    He is comfortable in bed.  Telemetry disclosure shows Atrial Fib with RVR.  Patient not given his dose of Metoprolol Succinate today.  Dose increased to 50 mg tablet daily.  First part of NST done today.    Vitals:  T(C): 37 (03-02-23 @ 07:53), Max: 37.2 (03-01-23 @ 07:45)  HR: 106 (03-02-23 @ 11:55) (80 - 150)  BP: 131/95 (03-02-23 @ 11:15) (100/101 - 200/97)  RR: 16 (03-02-23 @ 11:15) (16 - 22)  SpO2: 96% (03-02-23 @ 07:53) (95% - 99%)    Telemetry: Atrial Fibrillation,  Episode of RVR.    LABS:                        16.3   12.62 )-----------( 261      ( 02 Mar 2023 07:28 )             48.9     03-02    140  |  103  |  11  ----------------------------<  101<H>  4.1   |  25  |  0.8    Ca    9.3      02 Mar 2023 07:28  Mg     2.1     03-02    TPro  6.8  /  Alb  4.1  /  TBili  1.0  /  DBili  x   /  AST  33  /  ALT  101<H>  /  AlkPhos  73  03-02    PT/INR - ( 01 Mar 2023 16:25 )   PT: 15.50 sec;   INR: 1.35 ratio         PTT - ( 01 Mar 2023 16:25 )  PTT:37.4 sec  MEDICATIONS  (STANDING):  aDENosine Injectable (ADENOSCAN) 60 milliGRAM(s) IV Bolus once  apixaban 5 milliGRAM(s) Oral every 12 hours  atorvastatin 40 milliGRAM(s) Oral at bedtime  dextrose 5%. 1000 milliLiter(s) (50 mL/Hr) IV Continuous <Continuous>  dextrose 5%. 1000 milliLiter(s) (100 mL/Hr) IV Continuous <Continuous>  dextrose 50% Injectable 25 Gram(s) IV Push once  dextrose 50% Injectable 12.5 Gram(s) IV Push once  dextrose 50% Injectable 25 Gram(s) IV Push once  glucagon  Injectable 1 milliGRAM(s) IntraMuscular once  insulin lispro (ADMELOG) corrective regimen sliding scale   SubCutaneous three times a day before meals  insulin lispro (ADMELOG) corrective regimen sliding scale   SubCutaneous at bedtime  pantoprazole    Tablet 40 milliGRAM(s) Oral before breakfast    MEDICATIONS  (PRN):  acetaminophen     Tablet .. 650 milliGRAM(s) Oral every 6 hours PRN Temp greater or equal to 38C (100.4F), Mild Pain (1 - 3)  albuterol    90 MICROgram(s) HFA Inhaler 1 Puff(s) Inhalation two times a day PRN Shortness of Breath and/or Wheezing  aluminum hydroxide/magnesium hydroxide/simethicone Suspension 30 milliLiter(s) Oral every 4 hours PRN Dyspepsia  dextrose Oral Gel 15 Gram(s) Oral once PRN Blood Glucose LESS THAN 70 milliGRAM(s)/deciliter  melatonin 3 milliGRAM(s) Oral at bedtime PRN Insomnia  ondansetron Injectable 4 milliGRAM(s) IV Push every 8 hours PRN Nausea and/or Vomiting      PHYSICAL EXAM:  Constitutional: appears stated age, well developed/nourished, no acute distress  Eyes: EOM's intact.  PERRLA  ENMT: Normocephalic, atraumatic.   Neck: Jugular veins non-distended; no carotid bruits bilaterally.  Respiratory: respiratory pattern unlabored; lungs clear to auscultation bilaterally.  Cardiovascular: Irregular rhythm. Tachycardic.   S1 and S2 normal.  No murmur nor rub appreciated.  Abdomen: Soft, non-tender.  Normal bowel sounds.  Extremities: extremities warm; no cyanosis, clubbing or edema.  Pulses: Intact bilaterally  Skin: No gross abnormalities noted.  Musculoskeletal: No gross deformities  Neurological: Alert, oriented x 3.  No focal neurologic deficits noted.

## 2023-03-02 NOTE — PROGRESS NOTE ADULT - SUBJECTIVE AND OBJECTIVE BOX
Vitals:  T(C): 37 (03-02-23 @ 07:53), Max: 37.2 (03-01-23 @ 07:45)  HR: 106 (03-02-23 @ 11:55) (80 - 150)  BP: 131/95 (03-02-23 @ 11:15) (100/101 - 200/97)  RR: 16 (03-02-23 @ 11:15) (16 - 22)  SpO2: 96% (03-02-23 @ 07:53) (95% - 99%)  ECG:    LABS:                        16.3   12.62 )-----------( 261      ( 02 Mar 2023 07:28 )             48.9     03-02    140  |  103  |  11  ----------------------------<  101<H>  4.1   |  25  |  0.8    Ca    9.3      02 Mar 2023 07:28  Mg     2.1     03-02    TPro  6.8  /  Alb  4.1  /  TBili  1.0  /  DBili  x   /  AST  33  /  ALT  101<H>  /  AlkPhos  73  03-02    PT/INR - ( 01 Mar 2023 16:25 )   PT: 15.50 sec;   INR: 1.35 ratio         PTT - ( 01 Mar 2023 16:25 )  PTT:37.4 sec  MEDICATIONS  (STANDING):  aDENosine Injectable (ADENOSCAN) 60 milliGRAM(s) IV Bolus once  apixaban 5 milliGRAM(s) Oral every 12 hours  atorvastatin 40 milliGRAM(s) Oral at bedtime  dextrose 5%. 1000 milliLiter(s) (50 mL/Hr) IV Continuous <Continuous>  dextrose 5%. 1000 milliLiter(s) (100 mL/Hr) IV Continuous <Continuous>  dextrose 50% Injectable 25 Gram(s) IV Push once  dextrose 50% Injectable 12.5 Gram(s) IV Push once  dextrose 50% Injectable 25 Gram(s) IV Push once  glucagon  Injectable 1 milliGRAM(s) IntraMuscular once  insulin lispro (ADMELOG) corrective regimen sliding scale   SubCutaneous three times a day before meals  insulin lispro (ADMELOG) corrective regimen sliding scale   SubCutaneous at bedtime  metoprolol succinate ER 50 milliGRAM(s) Oral once  pantoprazole    Tablet 40 milliGRAM(s) Oral before breakfast    MEDICATIONS  (PRN):  acetaminophen     Tablet .. 650 milliGRAM(s) Oral every 6 hours PRN Temp greater or equal to 38C (100.4F), Mild Pain (1 - 3)  albuterol    90 MICROgram(s) HFA Inhaler 1 Puff(s) Inhalation two times a day PRN Shortness of Breath and/or Wheezing  aluminum hydroxide/magnesium hydroxide/simethicone Suspension 30 milliLiter(s) Oral every 4 hours PRN Dyspepsia  dextrose Oral Gel 15 Gram(s) Oral once PRN Blood Glucose LESS THAN 70 milliGRAM(s)/deciliter  melatonin 3 milliGRAM(s) Oral at bedtime PRN Insomnia  ondansetron Injectable 4 milliGRAM(s) IV Push every 8 hours PRN Nausea and/or Vomiting      PHYSICAL EXAM:    Constitutional: appears stated age, well developed/nourished, no acute distress  Eyes: EOM's intact.  PERRLA  ENMT: Normocephalic, atraumatic.  Clear oropharynx.  No ear discharge.  Neck: Jugular veins non-distended; no carotid bruits bilaterally.  Respiratory: respiratory pattern unlabored; no dullness to percussion; lungs clear to auscultation bilaterally.  Cardiovascular: Regular rhythm.  S1 and S2 normal.  No murmur nor rub appreciated.  Abdomen: Soft, non-tender.  Normal bowel sounds.  Extremities: extremities warm; no cyanosis, clubbing or edema.  Pulses: Intact bilaterally  Skin: No gross abnormalities noted.  Musculoskeletal: No gross deformities  Neurological: Alert, oriented x 3.  No focal neurologic deficits noted.

## 2023-03-02 NOTE — PROGRESS NOTE ADULT - SUBJECTIVE AND OBJECTIVE BOX
LA NENA[place]LE  57y, Male  Allergy: No Known Allergies    Hospital Day: 1d    Patient seen and examined. No acute events overnight    PMH/PSH:  PAST MEDICAL & SURGICAL HISTORY:  Afib      HTN (hypertension)      HLD (hyperlipidemia)      Type 2 diabetes mellitus      Pectus excavatum          VITALS:  T(F): 98.6 (03-02-23 @ 07:53), Max: 98.6 (03-02-23 @ 07:53)  HR: 134 (03-02-23 @ 11:15)  BP: 131/95 (03-02-23 @ 11:15) (103/76 - 165/89)  RR: 16 (03-02-23 @ 11:15)  SpO2: 96% (03-02-23 @ 07:53)    TESTS & MEASUREMENTS:  Weight (Kg): 104.3 (03-01-23 @ 07:45)  BMI (kg/m2): 29.5 (03-01)                          16.3   12.62 )-----------( 261      ( 02 Mar 2023 07:28 )             48.9     PT/INR - ( 01 Mar 2023 16:25 )   PT: 15.50 sec;   INR: 1.35 ratio         PTT - ( 01 Mar 2023 16:25 )  PTT:37.4 sec  03-02    140  |  103  |  11  ----------------------------<  101<H>  4.1   |  25  |  0.8    Ca    9.3      02 Mar 2023 07:28  Mg     2.1     03-02    TPro  6.8  /  Alb  4.1  /  TBili  1.0  /  DBili  x   /  AST  33  /  ALT  101<H>  /  AlkPhos  73  03-02    LIVER FUNCTIONS - ( 02 Mar 2023 07:28 )  Alb: 4.1 g/dL / Pro: 6.8 g/dL / ALK PHOS: 73 U/L / ALT: 101 U/L / AST: 33 U/L / GGT: x           CARDIAC MARKERS ( 02 Mar 2023 07:28 )  x     / <0.01 ng/mL / x     / x     / x      CARDIAC MARKERS ( 01 Mar 2023 16:25 )  x     / <0.01 ng/mL / x     / x     / x      CARDIAC MARKERS ( 01 Mar 2023 07:44 )  x     / <0.01 ng/mL / x     / x     / x                RADIOLOGY & ADDITIONAL TESTS:    RECENT DIAGNOSTIC ORDERS:  Diet, NPO:   Except Medications  With Ice Chips/Sips of Water (03-02-23 @ 10:29)  Cardiac Stress Test (Non Imaging) (03-02-23 @ 08:52)  NM Nuclear Stress Pharmacologic Multiple: Routine   Indication: r/o cardiac ischemia  Transport: Walking,  w/ Monitor (03-02-23 @ 08:52)  Thyroid Stimulating Hormone, Serum: 16:00 (03-01-23 @ 12:17)  A1C with Estimated Average Glucose: AM Sched. Collection: 02-Mar-2023 04:30 (03-01-23 @ 11:53)      MEDICATIONS:  MEDICATIONS  (STANDING):  aDENosine Injectable (ADENOSCAN) 60 milliGRAM(s) IV Bolus once  apixaban 5 milliGRAM(s) Oral every 12 hours  atorvastatin 40 milliGRAM(s) Oral at bedtime  dextrose 5%. 1000 milliLiter(s) (50 mL/Hr) IV Continuous <Continuous>  dextrose 5%. 1000 milliLiter(s) (100 mL/Hr) IV Continuous <Continuous>  dextrose 50% Injectable 25 Gram(s) IV Push once  dextrose 50% Injectable 12.5 Gram(s) IV Push once  dextrose 50% Injectable 25 Gram(s) IV Push once  glucagon  Injectable 1 milliGRAM(s) IntraMuscular once  insulin lispro (ADMELOG) corrective regimen sliding scale   SubCutaneous three times a day before meals  insulin lispro (ADMELOG) corrective regimen sliding scale   SubCutaneous at bedtime  pantoprazole    Tablet 40 milliGRAM(s) Oral before breakfast    MEDICATIONS  (PRN):  acetaminophen     Tablet .. 650 milliGRAM(s) Oral every 6 hours PRN Temp greater or equal to 38C (100.4F), Mild Pain (1 - 3)  albuterol    90 MICROgram(s) HFA Inhaler 1 Puff(s) Inhalation two times a day PRN Shortness of Breath and/or Wheezing  aluminum hydroxide/magnesium hydroxide/simethicone Suspension 30 milliLiter(s) Oral every 4 hours PRN Dyspepsia  dextrose Oral Gel 15 Gram(s) Oral once PRN Blood Glucose LESS THAN 70 milliGRAM(s)/deciliter  melatonin 3 milliGRAM(s) Oral at bedtime PRN Insomnia  ondansetron Injectable 4 milliGRAM(s) IV Push every 8 hours PRN Nausea and/or Vomiting      HOME MEDICATIONS:  albuterol 90 mcg/inh inhalation aerosol with adapter (03-01)  atorvastatin 40 mg oral tablet (03-01)  Eliquis 5 mg oral tablet (03-01)  Jardiance 25 mg oral tablet (03-01)  lisinopril 20 mg oral tablet (03-01)  metoprolol succinate 25 mg oral tablet, extended release (03-01)  Ozempic 2 mg/1.5 mL (1 mg dose) subcutaneous solution (03-01)  repaglinide 0.5 mg oral tablet (03-01)  Tresiba 100 units/mL subcutaneous solution (03-01)      REVIEW OF SYSTEMS:  All other review of systems is negative unless indicated above.     PHYSICAL EXAM:  PHYSICAL EXAM:  GENERAL: NAD, well-developed  HEAD:  Atraumatic, Normocephalic  NECK: Supple, No JVD  CHEST/LUNG: Clear to auscultation bilaterally; No wheeze  HEART: Irregular rate and rhythm; No murmurs, rubs, or gallops  ABDOMEN: Soft, Nontender, Nondistended; Bowel sounds present  EXTREMITIES:  2+ Peripheral Pulses, No clubbing, cyanosis, or edema  SKIN: No rashes or lesions

## 2023-03-03 ENCOUNTER — TRANSCRIPTION ENCOUNTER (OUTPATIENT)
Age: 58
End: 2023-03-03

## 2023-03-03 VITALS
DIASTOLIC BLOOD PRESSURE: 96 MMHG | OXYGEN SATURATION: 97 % | HEART RATE: 85 BPM | TEMPERATURE: 98 F | RESPIRATION RATE: 18 BRPM | SYSTOLIC BLOOD PRESSURE: 142 MMHG

## 2023-03-03 LAB
ALBUMIN SERPL ELPH-MCNC: 4.2 G/DL — SIGNIFICANT CHANGE UP (ref 3.5–5.2)
ALP SERPL-CCNC: 71 U/L — SIGNIFICANT CHANGE UP (ref 30–115)
ALT FLD-CCNC: 79 U/L — HIGH (ref 0–41)
ANION GAP SERPL CALC-SCNC: 13 MMOL/L — SIGNIFICANT CHANGE UP (ref 7–14)
AST SERPL-CCNC: 22 U/L — SIGNIFICANT CHANGE UP (ref 0–41)
BASOPHILS # BLD AUTO: 0.07 K/UL — SIGNIFICANT CHANGE UP (ref 0–0.2)
BASOPHILS NFR BLD AUTO: 0.6 % — SIGNIFICANT CHANGE UP (ref 0–1)
BILIRUB SERPL-MCNC: 1 MG/DL — SIGNIFICANT CHANGE UP (ref 0.2–1.2)
BUN SERPL-MCNC: 14 MG/DL — SIGNIFICANT CHANGE UP (ref 10–20)
CALCIUM SERPL-MCNC: 9.3 MG/DL — SIGNIFICANT CHANGE UP (ref 8.4–10.5)
CHLORIDE SERPL-SCNC: 103 MMOL/L — SIGNIFICANT CHANGE UP (ref 98–110)
CO2 SERPL-SCNC: 21 MMOL/L — SIGNIFICANT CHANGE UP (ref 17–32)
CREAT SERPL-MCNC: 0.7 MG/DL — SIGNIFICANT CHANGE UP (ref 0.7–1.5)
EGFR: 108 ML/MIN/1.73M2 — SIGNIFICANT CHANGE UP
EOSINOPHIL # BLD AUTO: 0.15 K/UL — SIGNIFICANT CHANGE UP (ref 0–0.7)
EOSINOPHIL NFR BLD AUTO: 1.3 % — SIGNIFICANT CHANGE UP (ref 0–8)
GLUCOSE BLDC GLUCOMTR-MCNC: 132 MG/DL — HIGH (ref 70–99)
GLUCOSE BLDC GLUCOMTR-MCNC: 97 MG/DL — SIGNIFICANT CHANGE UP (ref 70–99)
GLUCOSE SERPL-MCNC: 210 MG/DL — HIGH (ref 70–99)
HCT VFR BLD CALC: 48.6 % — SIGNIFICANT CHANGE UP (ref 42–52)
HGB BLD-MCNC: 16.3 G/DL — SIGNIFICANT CHANGE UP (ref 14–18)
IMM GRANULOCYTES NFR BLD AUTO: 0.4 % — HIGH (ref 0.1–0.3)
LYMPHOCYTES # BLD AUTO: 3.54 K/UL — HIGH (ref 1.2–3.4)
LYMPHOCYTES # BLD AUTO: 31.3 % — SIGNIFICANT CHANGE UP (ref 20.5–51.1)
MAGNESIUM SERPL-MCNC: 2 MG/DL — SIGNIFICANT CHANGE UP (ref 1.8–2.4)
MCHC RBC-ENTMCNC: 27.5 PG — SIGNIFICANT CHANGE UP (ref 27–31)
MCHC RBC-ENTMCNC: 33.5 G/DL — SIGNIFICANT CHANGE UP (ref 32–37)
MCV RBC AUTO: 82 FL — SIGNIFICANT CHANGE UP (ref 80–94)
MONOCYTES # BLD AUTO: 0.74 K/UL — HIGH (ref 0.1–0.6)
MONOCYTES NFR BLD AUTO: 6.5 % — SIGNIFICANT CHANGE UP (ref 1.7–9.3)
NEUTROPHILS # BLD AUTO: 6.77 K/UL — HIGH (ref 1.4–6.5)
NEUTROPHILS NFR BLD AUTO: 59.9 % — SIGNIFICANT CHANGE UP (ref 42.2–75.2)
NRBC # BLD: 0 /100 WBCS — SIGNIFICANT CHANGE UP (ref 0–0)
PLATELET # BLD AUTO: 283 K/UL — SIGNIFICANT CHANGE UP (ref 130–400)
POTASSIUM SERPL-MCNC: 4 MMOL/L — SIGNIFICANT CHANGE UP (ref 3.5–5)
POTASSIUM SERPL-SCNC: 4 MMOL/L — SIGNIFICANT CHANGE UP (ref 3.5–5)
PROT SERPL-MCNC: 6.7 G/DL — SIGNIFICANT CHANGE UP (ref 6–8)
RBC # BLD: 5.93 M/UL — SIGNIFICANT CHANGE UP (ref 4.7–6.1)
RBC # FLD: 14.2 % — SIGNIFICANT CHANGE UP (ref 11.5–14.5)
SODIUM SERPL-SCNC: 137 MMOL/L — SIGNIFICANT CHANGE UP (ref 135–146)
WBC # BLD: 11.31 K/UL — HIGH (ref 4.8–10.8)
WBC # FLD AUTO: 11.31 K/UL — HIGH (ref 4.8–10.8)

## 2023-03-03 PROCEDURE — 99239 HOSP IP/OBS DSCHRG MGMT >30: CPT

## 2023-03-03 PROCEDURE — 93306 TTE W/DOPPLER COMPLETE: CPT | Mod: 26

## 2023-03-03 RX ORDER — DILTIAZEM HCL 120 MG
180 CAPSULE, EXT RELEASE 24 HR ORAL DAILY
Refills: 0 | Status: DISCONTINUED | OUTPATIENT
Start: 2023-03-03 | End: 2023-03-03

## 2023-03-03 RX ORDER — METOPROLOL TARTRATE 50 MG
1 TABLET ORAL
Qty: 30 | Refills: 0 | DISCHARGE
Start: 2023-03-03 | End: 2023-04-01

## 2023-03-03 RX ORDER — DILTIAZEM HCL 120 MG
10 CAPSULE, EXT RELEASE 24 HR ORAL ONCE
Refills: 0 | Status: COMPLETED | OUTPATIENT
Start: 2023-03-03 | End: 2023-03-03

## 2023-03-03 RX ORDER — METOPROLOL TARTRATE 50 MG
50 TABLET ORAL ONCE
Refills: 0 | Status: DISCONTINUED | OUTPATIENT
Start: 2023-03-03 | End: 2023-03-03

## 2023-03-03 RX ORDER — METOPROLOL TARTRATE 50 MG
1 TABLET ORAL
Qty: 0 | Refills: 0 | DISCHARGE

## 2023-03-03 RX ORDER — METOPROLOL TARTRATE 50 MG
100 TABLET ORAL DAILY
Refills: 0 | Status: DISCONTINUED | OUTPATIENT
Start: 2023-03-04 | End: 2023-03-04

## 2023-03-03 RX ORDER — METOPROLOL TARTRATE 50 MG
1 TABLET ORAL
Qty: 30 | Refills: 0
Start: 2023-03-03 | End: 2023-04-01

## 2023-03-03 RX ORDER — DILTIAZEM HCL 120 MG
180 CAPSULE, EXT RELEASE 24 HR ORAL DAILY
Refills: 0 | Status: DISCONTINUED | OUTPATIENT
Start: 2023-03-03 | End: 2023-03-04

## 2023-03-03 RX ORDER — DILTIAZEM HCL 120 MG
120 CAPSULE, EXT RELEASE 24 HR ORAL DAILY
Refills: 0 | Status: DISCONTINUED | OUTPATIENT
Start: 2023-03-03 | End: 2023-03-03

## 2023-03-03 RX ORDER — DILTIAZEM HCL 120 MG
1 CAPSULE, EXT RELEASE 24 HR ORAL
Qty: 30 | Refills: 0
Start: 2023-03-03 | End: 2023-04-01

## 2023-03-03 RX ADMIN — PANTOPRAZOLE SODIUM 40 MILLIGRAM(S): 20 TABLET, DELAYED RELEASE ORAL at 06:04

## 2023-03-03 RX ADMIN — LISINOPRIL 20 MILLIGRAM(S): 2.5 TABLET ORAL at 06:04

## 2023-03-03 RX ADMIN — APIXABAN 5 MILLIGRAM(S): 2.5 TABLET, FILM COATED ORAL at 06:04

## 2023-03-03 RX ADMIN — Medication 180 MILLIGRAM(S): at 14:01

## 2023-03-03 RX ADMIN — Medication 10 MILLIGRAM(S): at 03:18

## 2023-03-03 RX ADMIN — Medication 50 MILLIGRAM(S): at 06:04

## 2023-03-03 NOTE — DISCHARGE NOTE PROVIDER - PROVIDER TOKENS
PROVIDER:[TOKEN:[[id number]],FOLLOWUP:[1-3 days]],PROVIDER:[TOKEN:[[id number]],FOLLOWUP:[1 week]]

## 2023-03-03 NOTE — DISCHARGE NOTE PROVIDER - NSDCMRMEDTOKEN_GEN_ALL_CORE_FT
albuterol 90 mcg/inh inhalation aerosol with adapter: 1 puff(s) inhaled 2 times a day, As Needed SOB/Wheezing  atorvastatin 40 mg oral tablet: 1 tab(s) orally once a day  Eliquis 5 mg oral tablet: 1 tab(s) orally 2 times a day  Jardiance 25 mg oral tablet: 1 tab(s) orally once a day (in the morning)  lisinopril 20 mg oral tablet: 1 tab(s) orally once a day  metoprolol succinate 25 mg oral tablet, extended release: 1 tab(s) orally once a day  Ozempic 2 mg/1.5 mL (1 mg dose) subcutaneous solution: 1 milligram(s) subcutaneous once a week  repaglinide 0.5 mg oral tablet: 1 tab(s) orally 3 times a day (before meals)  Tresiba 100 units/mL subcutaneous solution: 80 unit(s) subcutaneous once a day   albuterol 90 mcg/inh inhalation aerosol with adapter: 1 puff(s) inhaled 2 times a day, As Needed SOB/Wheezing  atorvastatin 40 mg oral tablet: 1 tab(s) orally once a day  dilTIAZem 180 mg/24 hours oral capsule, extended release: 1 cap(s) orally once a day  Eliquis 5 mg oral tablet: 1 tab(s) orally 2 times a day  Jardiance 25 mg oral tablet: 1 tab(s) orally once a day (in the morning)  lisinopril 20 mg oral tablet: 1 tab(s) orally once a day  metoprolol succinate 100 mg oral tablet, extended release: 1 tab(s) orally once a day  Ozempic 2 mg/1.5 mL (1 mg dose) subcutaneous solution: 1 milligram(s) subcutaneous once a week  repaglinide 0.5 mg oral tablet: 1 tab(s) orally 3 times a day (before meals)  Tresiba 100 units/mL subcutaneous solution: 80 unit(s) subcutaneous once a day

## 2023-03-03 NOTE — DISCHARGE NOTE PROVIDER - CARE PROVIDER_API CALL
Gonzalez Zavala (MD)  Cardiovascular Disease  [address], [address]  [place] [address]  Phone: [phone]  Fax: [phone]  Follow Up Time: 1-3 days    Dany Garcia)  Internal Medicine  [address] [place] [address]  Phone: [phone]  Fax: [phone]  Follow Up Time: 1 week

## 2023-03-03 NOTE — DISCHARGE NOTE PROVIDER - NSDCQMSTROKE_NEU_ALL_CORE
Airway  Date/Time: 8/17/2021 3:27 PM  Urgency: elective      General Information and Staff    Patient location during procedure: OR  Anesthesiologist: Carlos Alberto Pereira MD  Performed: anesthesiologist     Indications and Patient Condition  Indications for No

## 2023-03-03 NOTE — DISCHARGE NOTE PROVIDER - ATTENDING DISCHARGE PHYSICAL EXAMINATION:
PHYSICAL EXAM:  GENERAL: NAD, well-developed  HEAD:  Atraumatic, Normocephalic  EYES: EOMI, PERRLA, conjunctiva and sclera clear  NECK: Supple, No JVD  CHEST/LUNG: Clear to auscultation bilaterally; No wheeze  HEART: Irregular rate and rhythm; No murmurs, rubs, or gallops  ABDOMEN: Soft, Nontender, Nondistended; Bowel sounds present  EXTREMITIES:  2+ Peripheral Pulses, No clubbing, cyanosis, or edema  PSYCH: AAOx3  SKIN: No rashes or lesions

## 2023-03-03 NOTE — DISCHARGE NOTE PROVIDER - CARE PROVIDERS DIRECT ADDRESSES
[email or web address]

## 2023-03-03 NOTE — DISCHARGE NOTE NURSING/CASE MANAGEMENT/SOCIAL WORK - NSDCPEFALRISK_GEN_ALL_CORE
For information on Fall & Injury Prevention, visit: [email or web address] OR  [email or web address] OR  https://www.cdc.gov/steadi/patient.html

## 2023-03-03 NOTE — DISCHARGE NOTE NURSING/CASE MANAGEMENT/SOCIAL WORK - PATIENT PORTAL LINK FT
You can access the FollowMyHealth Patient Portal offered by Health system by registering at the following website: [email or web address]. By joining [facility] FollowMyHealth portal, you will also be able to view your health information using other applications (apps) compatible with our system.

## 2023-03-03 NOTE — DISCHARGE NOTE PROVIDER - NSDCFUSCHEDAPPT_GEN_ALL_CORE_FT
Unknown, Doctor  [facility]  [facility] PreAdmits  Scheduled Appointment: 04/25/2023    [facility]  PULKELSI Banner Aneesh Munoz  Scheduled Appointment: 04/25/2023

## 2023-03-03 NOTE — DISCHARGE NOTE PROVIDER - HOSPITAL COURSE
Pt is 57-year-old-male with a history significant for Afib on eliquis (follows Dr. Zavala), HTN, HLD, DM, and pectus excavatum (s/p surgical repair 40 years ago), who presents to the ED after feeling palpitations. Patient reports feeling palpitations for the last month as well as positional orthopnea, whereby he sleep with 2 pillows and has his matress on incline because he gets short of breath if he lays flat. Pt denies ever having experienced CP or pleuritic pain, does report feeling R ankle pain which he attributes to his varicose veins. No pitting edema or signs of volume overload.    In the ED, pt was found to be in afib RVR with 's, that resolved with IV metoprolol 5 in the AM,  metoprolol succinate was given as pt did not take his AM medications at the time. Diltiazem IV was given for another episode of 's.  Labs significant for Troponin that was hemolyzed, mild transaminitis (AST/ALT; alk p normal); Echo in 11/2019 showed EF 49% CXR showed b/l reticulonodular opacities with sharp costovertebral angles,   Pt remained HD stable, Dr. Zavala was informed about the admission.     Pt will be admitted for further management of afib w/ rvr. The following problems addressed during this admission:    #Paroxysmal afib w RVR  - Increase metoprolol succinate to 100mg once a day  - added cardizem 180 po daily  - Continue eliquis 5 BID  - echo - EF 59%; biatrial enlargement    #Dyspnea on exertion  - NST - to be done outpatient; scheduled for next week    #HTN  - c/w home metoprolol succ  - Resume lisinopril 20mg qD2    #HLD  - c/w home atorvastatin 40    #DM  - SS for now   Pt is 57-year-old-male with a history significant for Afib on eliquis (follows Dr. Zavala), HTN, HLD, DM, and pectus excavatum (s/p surgical repair 40 years ago), who presents to the ED after feeling palpitations. Patient reports feeling palpitations for the last month as well as positional orthopnea, whereby he sleep with 2 pillows and has his matress on incline because he gets short of breath if he lays flat. Pt denies ever having experienced CP or pleuritic pain, does report feeling R ankle pain which he attributes to his varicose veins. No pitting edema or signs of volume overload.    In the ED, pt was found to be in afib RVR with 's, that resolved with IV metoprolol 5 in the AM,  metoprolol succinate was given as pt did not take his AM medications at the time. Diltiazem IV was given for another episode of 's.  Labs significant for Troponin that was hemolyzed, mild transaminitis (AST/ALT; alk p normal); Echo in 11/2019 showed EF 49% CXR showed b/l reticulonodular opacities with sharp costovertebral angles,   Pt remained HD stable, Dr. Zavala was informed about the admission.     Pt will be admitted for further management of afib w/ rvr. The following problems addressed during this admission:    PATIENT DISCHARGED AGAINST MEDICAL ADVICE    #Paroxysmal afib w RVR  - Increase metoprolol succinate to 100mg once a day  - added cardizem 180 po daily  - Continue eliquis 5 BID  - echo - EF 59%; biatrial enlargement    #Dyspnea on exertion  - NST - to be done outpatient; scheduled for next week    #HTN  - c/w home metoprolol succ  - Resume lisinopril 20mg qD2    #HLD  - c/w home atorvastatin 40    #DM  - SS for now

## 2023-03-03 NOTE — DISCHARGE NOTE PROVIDER - NSDCCPCAREPLAN_GEN_ALL_CORE_FT
PRINCIPAL DISCHARGE DIAGNOSIS  Diagnosis: Atrial fibrillation with RVR  Assessment and Plan of Treatment: You were admitted to the hospital for atrial fibrillation with RVR. During your admission you were seen by the cardiology team. You were treated with oral and IV medications to control your heart rate. Please continue taking all medications  as directed. Please follow up with Dr. Zavala outpatient. You will need a stress test.    Call your local emergency number (911 in the [place]) or have someone call if:   •You have any of the following signs of a heart attack: ?Squeezing, pressure, or pain in your chest  ?You may also have any of the following:   ?Discomfort or pain in your back, neck, jaw, stomach, or arm  ?Shortness of breath  ?Nausea or vomiting  ?Lightheadedness or a sudden cold sweat  •You have any of the following signs of a stroke: ?Numbness or drooping on one side of your face   ?Weakness in an arm or leg  ?Confusion or difficulty speaking  ?Dizziness, a severe headache, or vision loss  Seek immediate care if:   •Your arm or leg feels warm, tender, and painful. It may look swollen and red.  •Your heart rate is more than 110 beats per minute.  •You are short of breath, even at rest.  Call your doctor or cardiologist if:   •You have new or worsening swelling in your legs, feet, ankles, or abdomen.  •You have questions or concerns about your condition or care.       PRINCIPAL DISCHARGE DIAGNOSIS  Diagnosis: Atrial fibrillation with RVR  Assessment and Plan of Treatment: You were admitted to the hospital for atrial fibrillation with RVR. During your admission you were seen by the cardiology team. You were treated with oral and IV medications to control your heart rate. Please continue taking all medications  as directed. Please follow up with Dr. Zavala outpatient. You will need a stress test. Your heart rate is still not under controll. YOU ARE LEAVING AGAINST MEDICAL ADVICE.  Call your local emergency number (911 in the [place]) or have someone call if:   •You have any of the following signs of a heart attack: ?Squeezing, pressure, or pain in your chest  ?You may also have any of the following:   ?Discomfort or pain in your back, neck, jaw, stomach, or arm  ?Shortness of breath  ?Nausea or vomiting  ?Lightheadedness or a sudden cold sweat  •You have any of the following signs of a stroke: ?Numbness or drooping on one side of your face   ?Weakness in an arm or leg  ?Confusion or difficulty speaking  ?Dizziness, a severe headache, or vision loss  Seek immediate care if:   •Your arm or leg feels warm, tender, and painful. It may look swollen and red.  •Your heart rate is more than 110 beats per minute.  •You are short of breath, even at rest.  Call your doctor or cardiologist if:   •You have new or worsening swelling in your legs, feet, ankles, or abdomen.  •You have questions or concerns about your condition or care.

## 2023-03-04 PROCEDURE — 78452 HT MUSCLE IMAGE SPECT MULT: CPT | Mod: 26

## 2023-03-11 DIAGNOSIS — Z86.16 PERSONAL HISTORY OF COVID-19: ICD-10-CM

## 2023-03-11 DIAGNOSIS — E78.5 HYPERLIPIDEMIA, UNSPECIFIED: ICD-10-CM

## 2023-03-11 DIAGNOSIS — Z20.822 CONTACT WITH AND (SUSPECTED) EXPOSURE TO COVID-19: ICD-10-CM

## 2023-03-11 DIAGNOSIS — Z79.01 LONG TERM (CURRENT) USE OF ANTICOAGULANTS: ICD-10-CM

## 2023-03-11 DIAGNOSIS — Z79.899 OTHER LONG TERM (CURRENT) DRUG THERAPY: ICD-10-CM

## 2023-03-11 DIAGNOSIS — E11.9 TYPE 2 DIABETES MELLITUS WITHOUT COMPLICATIONS: ICD-10-CM

## 2023-03-11 DIAGNOSIS — R10.11 RIGHT UPPER QUADRANT PAIN: ICD-10-CM

## 2023-03-11 DIAGNOSIS — I10 ESSENTIAL (PRIMARY) HYPERTENSION: ICD-10-CM

## 2023-03-11 DIAGNOSIS — Z79.85 LONG-TERM (CURRENT) USE OF INJECTABLE NON-INSULIN ANTIDIABETIC DRUGS: ICD-10-CM

## 2023-03-11 DIAGNOSIS — R74.01 ELEVATION OF LEVELS OF LIVER TRANSAMINASE LEVELS: ICD-10-CM

## 2023-03-11 DIAGNOSIS — E87.5 HYPERKALEMIA: ICD-10-CM

## 2023-03-11 DIAGNOSIS — I48.0 PAROXYSMAL ATRIAL FIBRILLATION: ICD-10-CM

## 2023-03-11 DIAGNOSIS — Z79.84 LONG TERM (CURRENT) USE OF ORAL HYPOGLYCEMIC DRUGS: ICD-10-CM

## 2023-04-05 PROBLEM — E11.9 TYPE 2 DIABETES MELLITUS WITHOUT COMPLICATIONS: Chronic | Status: ACTIVE | Noted: 2023-03-01

## 2023-04-05 PROBLEM — I48.91 UNSPECIFIED ATRIAL FIBRILLATION: Chronic | Status: ACTIVE | Noted: 2023-03-01

## 2023-04-05 PROBLEM — I10 ESSENTIAL (PRIMARY) HYPERTENSION: Chronic | Status: ACTIVE | Noted: 2023-03-01

## 2023-04-05 PROBLEM — E78.5 HYPERLIPIDEMIA, UNSPECIFIED: Chronic | Status: ACTIVE | Noted: 2023-03-01

## 2023-04-25 ENCOUNTER — OUTPATIENT (OUTPATIENT)
Dept: OUTPATIENT SERVICES | Facility: HOSPITAL | Age: 58
LOS: 1 days | End: 2023-04-25
Payer: MEDICARE

## 2023-04-25 ENCOUNTER — APPOINTMENT (OUTPATIENT)
Dept: PULMONOLOGY | Facility: CLINIC | Age: 58
End: 2023-04-25
Payer: MEDICARE

## 2023-04-25 VITALS
TEMPERATURE: 97 F | SYSTOLIC BLOOD PRESSURE: 136 MMHG | HEART RATE: 71 BPM | DIASTOLIC BLOOD PRESSURE: 90 MMHG | HEIGHT: 73 IN | OXYGEN SATURATION: 99 % | WEIGHT: 234 LBS | BODY MASS INDEX: 31.01 KG/M2

## 2023-04-25 DIAGNOSIS — J98.4 OTHER DISORDERS OF LUNG: ICD-10-CM

## 2023-04-25 DIAGNOSIS — Z00.00 ENCOUNTER FOR GENERAL ADULT MEDICAL EXAMINATION WITHOUT ABNORMAL FINDINGS: ICD-10-CM

## 2023-04-25 DIAGNOSIS — G47.33 OBSTRUCTIVE SLEEP APNEA (ADULT) (PEDIATRIC): ICD-10-CM

## 2023-04-25 DIAGNOSIS — R06.09 OTHER FORMS OF DYSPNEA: ICD-10-CM

## 2023-04-25 DIAGNOSIS — Q67.6 PECTUS EXCAVATUM: ICD-10-CM

## 2023-04-25 DIAGNOSIS — Q67.6 PECTUS EXCAVATUM: Chronic | ICD-10-CM

## 2023-04-25 PROCEDURE — 99214 OFFICE O/P EST MOD 30 MIN: CPT | Mod: GC

## 2023-04-25 PROCEDURE — 99214 OFFICE O/P EST MOD 30 MIN: CPT

## 2023-04-25 NOTE — HISTORY OF PRESENT ILLNESS
[Never] : never [TextBox_4] : 57 year old male with PMH of DM, HTN, HLD, enlarged prostate, history of pectus excavatum and restrictive lung disease secondary to that, who presents for follow up.\par \par Previous office visit patient was sent for repeat PFT and HRCT to evaluate persistent dyspnea. \par Patient PFTs show restrictive lung disease but full test could not be completed at the time. Patient did not have HRCT yet.\par Patient continues to endorse SOB on exertion since previous visit. 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 Denies smoking, drinking, and drug use. \par At prior visit pt did not want to have sleep apnea test but because of his worsening Afib is now willing to get referral.

## 2023-04-25 NOTE — ASSESSMENT
[FreeTextEntry1] : 57 year old male with PMH of DM, HTN, HLD, enlarged prostate, history of pectus excavatum and restrictive lung disease secondary to that, who presents for follow up.\par \par # AMIN \par # Restrictive lung disease s/p pectus excavatum \par - PFT in 2022 FVC 49%, FEV1 52%, FEV/FVC 83% - restrictive lung disease, normal DLCO \par - Patient had difficulty performing PFT - will need repeat to complete full test  \par - Repeat PFT w/ neuromuscular testing \par - TTE LVEF of 49% with no evidence of Pulmonary HTN. \par - did not complete HRCT - will reorder \par \par #Afib\par #Suspected sleep apnea \par - endorses sxs of daytime fatigue, snoring w/ hx of afib and htn \par - previously refused testing but now agreeable will refer for ZARIA testing. \par \par RTC in 3 months

## 2023-04-25 NOTE — PHYSICAL EXAM
[No Acute Distress] : no acute distress [Normal Oropharynx] : normal oropharynx [Normal Appearance] : normal appearance [No Neck Mass] : no neck mass [Normal S1, S2] : normal s1, s2 [No Murmurs] : no murmurs [No Resp Distress] : no resp distress [Clear to Auscultation Bilaterally] : clear to auscultation bilaterally [Benign] : benign [Normal Gait] : normal gait [No Cyanosis] : no cyanosis [No Edema] : no edema [FROM] : FROM [Normal Color/ Pigmentation] : normal color/ pigmentation [No Focal Deficits] : no focal deficits [Oriented x3] : oriented x3 [Normal Affect] : normal affect [TextBox_54] : Afib  [TextBox_80] : Pectus excavatum

## 2023-04-25 NOTE — REVIEW OF SYSTEMS
[Chest Tightness] : chest tightness [SOB on Exertion] : sob on exertion [Chest Discomfort] : chest discomfort [Palpitations] : palpitations [Negative] : Endocrine [Cough] : no cough [Hemoptysis] : no hemoptysis [Frequent URIs] : no frequent URIs [Sputum] : no sputum [Dyspnea] : no dyspnea [Pleuritic Pain] : no pleuritic pain [Wheezing] : no wheezing [A.M. Dry Mouth] : no a.m. dry mouth [Claudication] : no claudication [Edema] : no edema [Orthopnea] : no orthopnea [Syncope] : no syncope

## 2023-05-09 ENCOUNTER — NON-APPOINTMENT (OUTPATIENT)
Age: 58
End: 2023-05-09

## 2023-06-13 ENCOUNTER — OUTPATIENT (OUTPATIENT)
Dept: OUTPATIENT SERVICES | Facility: HOSPITAL | Age: 58
LOS: 1 days | Discharge: HOME | End: 2023-06-13
Payer: MEDICARE

## 2023-06-13 ENCOUNTER — APPOINTMENT (OUTPATIENT)
Dept: SLEEP CENTER | Facility: HOSPITAL | Age: 58
End: 2023-06-13

## 2023-06-13 DIAGNOSIS — Q67.6 PECTUS EXCAVATUM: Chronic | ICD-10-CM

## 2023-06-13 DIAGNOSIS — G47.33 OBSTRUCTIVE SLEEP APNEA (ADULT) (PEDIATRIC): ICD-10-CM

## 2023-06-13 PROCEDURE — 95810 POLYSOM 6/> YRS 4/> PARAM: CPT

## 2023-06-15 DIAGNOSIS — G47.33 OBSTRUCTIVE SLEEP APNEA (ADULT) (PEDIATRIC): ICD-10-CM

## 2023-07-05 ENCOUNTER — APPOINTMENT (OUTPATIENT)
Dept: UROLOGY | Facility: CLINIC | Age: 58
End: 2023-07-05

## 2023-07-11 ENCOUNTER — OUTPATIENT (OUTPATIENT)
Dept: OUTPATIENT SERVICES | Facility: HOSPITAL | Age: 58
LOS: 1 days | End: 2023-07-11
Payer: MEDICARE

## 2023-07-11 ENCOUNTER — NON-APPOINTMENT (OUTPATIENT)
Age: 58
End: 2023-07-11

## 2023-07-11 ENCOUNTER — APPOINTMENT (OUTPATIENT)
Dept: PULMONOLOGY | Facility: CLINIC | Age: 58
End: 2023-07-11
Payer: MEDICARE

## 2023-07-11 VITALS
DIASTOLIC BLOOD PRESSURE: 92 MMHG | HEART RATE: 105 BPM | WEIGHT: 230 LBS | BODY MASS INDEX: 30.48 KG/M2 | OXYGEN SATURATION: 97 % | SYSTOLIC BLOOD PRESSURE: 128 MMHG | TEMPERATURE: 98 F | HEIGHT: 73 IN

## 2023-07-11 DIAGNOSIS — Z00.00 ENCOUNTER FOR GENERAL ADULT MEDICAL EXAMINATION WITHOUT ABNORMAL FINDINGS: ICD-10-CM

## 2023-07-11 DIAGNOSIS — Q67.6 PECTUS EXCAVATUM: ICD-10-CM

## 2023-07-11 DIAGNOSIS — R06.02 SHORTNESS OF BREATH: ICD-10-CM

## 2023-07-11 DIAGNOSIS — R60.0 LOCALIZED EDEMA: ICD-10-CM

## 2023-07-11 DIAGNOSIS — Q67.6 PECTUS EXCAVATUM: Chronic | ICD-10-CM

## 2023-07-11 DIAGNOSIS — J98.4 OTHER DISORDERS OF LUNG: ICD-10-CM

## 2023-07-11 PROCEDURE — 99215 OFFICE O/P EST HI 40 MIN: CPT

## 2023-07-11 PROCEDURE — 99215 OFFICE O/P EST HI 40 MIN: CPT | Mod: GC

## 2023-07-11 NOTE — ASSESSMENT
[FreeTextEntry1] : 57 year old male with PMH of DM, HTN, HLD, enlarged prostate, history of pectus excavatum and restrictive lung disease secondary to that, who presents for follow up.\par \par # Acute decompensated heart failure\par - Patient off lasix for the past month, with increasing lower extremity edema and AMIN\par - No evidence of pulmonary edema today\par - Resume lasix, call cardiology today\par - After review of this patient's case and its severity, hospitalization was discussed with the patient.  At this time, hospitalization is not required, but Mr. DEUTSCH remains at significantly increased risk.  Warning signs and alarm symptoms were discussed, and he was told to contact the office immediately or present to the ED if his condition continues to deteriorate.\par \par # Restrictive lung disease \par #  pectus excavatum \par - PFT in 2022 FVC 49%, FEV1 52%, FEV/FVC 83% - restrictive lung disease, normal DLCO \par - Repeat PFT w/ neuromuscular testing ordered (MIP, MEP, VC upright and supine)\par -  HRCT ordered and approved by insurance \par \par # SOB with b/l LE edema likely 2/2 CHF exacerbation\par - pt has not been taking his lasix as prescribed\par - advised pt to resume lasix and follow up with Cardio as OP \par \par # ZARIA\par - did not tolerate sleep study at the polysomnography lab, will order home sleep study \par \par #Afib\par - scheduled for cardioversion \par

## 2023-07-11 NOTE — REVIEW OF SYSTEMS
[Chest Tightness] : chest tightness [Dyspnea] : dyspnea [SOB on Exertion] : sob on exertion [Chest Discomfort] : chest discomfort [Edema] : edema [Palpitations] : palpitations [Negative] : Neurologic

## 2023-07-11 NOTE — HISTORY OF PRESENT ILLNESS
[TextBox_4] : 58 year old male with PMH of DM, HTN, HLD, enlarged prostate, history of pectus excavatum and restrictive lung disease secondary to that, who presents for follow up. Pt was scheduled for Sleep studies last time but could not complete it because he was uncomfortable. Pt was also scheduled for a HRCT, but did not have it yet.

## 2023-07-11 NOTE — PHYSICAL EXAM
[No Acute Distress] : no acute distress [Normal Oropharynx] : normal oropharynx [Normal Appearance] : normal appearance [No Resp Distress] : no resp distress [Clear to Auscultation Bilaterally] : clear to auscultation bilaterally [Benign] : benign [3+ Pitting] : 3+ pitting [No Focal Deficits] : no focal deficits [Oriented x3] : oriented x3 [Normal Affect] : normal affect [TextBox_54] : Irregular

## 2023-07-12 DIAGNOSIS — R60.0 LOCALIZED EDEMA: ICD-10-CM

## 2023-07-12 DIAGNOSIS — J98.4 OTHER DISORDERS OF LUNG: ICD-10-CM

## 2023-07-12 DIAGNOSIS — G47.33 OBSTRUCTIVE SLEEP APNEA (ADULT) (PEDIATRIC): ICD-10-CM

## 2023-07-12 DIAGNOSIS — I50.9 HEART FAILURE, UNSPECIFIED: ICD-10-CM

## 2023-07-12 DIAGNOSIS — R06.02 SHORTNESS OF BREATH: ICD-10-CM

## 2023-07-12 DIAGNOSIS — Q67.6 PECTUS EXCAVATUM: ICD-10-CM

## 2023-07-27 ENCOUNTER — OUTPATIENT (OUTPATIENT)
Dept: OUTPATIENT SERVICES | Facility: HOSPITAL | Age: 58
LOS: 1 days | End: 2023-07-27
Payer: MEDICARE

## 2023-07-27 DIAGNOSIS — Z00.8 ENCOUNTER FOR OTHER GENERAL EXAMINATION: ICD-10-CM

## 2023-07-27 DIAGNOSIS — J98.4 OTHER DISORDERS OF LUNG: ICD-10-CM

## 2023-07-27 PROCEDURE — 71250 CT THORAX DX C-: CPT | Mod: 26

## 2023-07-27 PROCEDURE — 71250 CT THORAX DX C-: CPT

## 2023-07-28 DIAGNOSIS — J98.4 OTHER DISORDERS OF LUNG: ICD-10-CM

## 2023-08-01 ENCOUNTER — APPOINTMENT (OUTPATIENT)
Dept: PULMONOLOGY | Facility: HOSPITAL | Age: 58
End: 2023-08-01
Payer: MEDICARE

## 2023-08-01 ENCOUNTER — OUTPATIENT (OUTPATIENT)
Dept: OUTPATIENT SERVICES | Facility: HOSPITAL | Age: 58
LOS: 1 days | End: 2023-08-01
Payer: MEDICARE

## 2023-08-01 DIAGNOSIS — Q67.6 PECTUS EXCAVATUM: Chronic | ICD-10-CM

## 2023-08-01 DIAGNOSIS — J98.4 OTHER DISORDERS OF LUNG: ICD-10-CM

## 2023-08-01 PROCEDURE — 94727 GAS DIL/WSHOT DETER LNG VOL: CPT | Mod: 26

## 2023-08-01 PROCEDURE — 94726 PLETHYSMOGRAPHY LUNG VOLUMES: CPT

## 2023-08-01 PROCEDURE — 94729 DIFFUSING CAPACITY: CPT | Mod: 26

## 2023-08-01 PROCEDURE — 94729 DIFFUSING CAPACITY: CPT

## 2023-08-01 PROCEDURE — 94060 EVALUATION OF WHEEZING: CPT | Mod: 26

## 2023-08-01 PROCEDURE — 94070 EVALUATION OF WHEEZING: CPT

## 2023-08-02 DIAGNOSIS — J98.4 OTHER DISORDERS OF LUNG: ICD-10-CM

## 2023-08-03 ENCOUNTER — APPOINTMENT (OUTPATIENT)
Dept: SLEEP CENTER | Facility: HOSPITAL | Age: 58
End: 2023-08-03

## 2023-12-08 ENCOUNTER — OUTPATIENT (OUTPATIENT)
Dept: OUTPATIENT SERVICES | Facility: HOSPITAL | Age: 58
LOS: 1 days | End: 2023-12-08
Payer: MEDICARE

## 2023-12-08 DIAGNOSIS — J18.9 PNEUMONIA, UNSPECIFIED ORGANISM: ICD-10-CM

## 2023-12-08 DIAGNOSIS — Q67.6 PECTUS EXCAVATUM: Chronic | ICD-10-CM

## 2023-12-08 PROCEDURE — 71046 X-RAY EXAM CHEST 2 VIEWS: CPT

## 2023-12-08 PROCEDURE — 71046 X-RAY EXAM CHEST 2 VIEWS: CPT | Mod: 26

## 2023-12-09 DIAGNOSIS — J18.9 PNEUMONIA, UNSPECIFIED ORGANISM: ICD-10-CM

## 2024-01-11 ENCOUNTER — NON-APPOINTMENT (OUTPATIENT)
Age: 59
End: 2024-01-11

## 2024-02-12 ENCOUNTER — APPOINTMENT (OUTPATIENT)
Dept: ELECTROPHYSIOLOGY | Facility: CLINIC | Age: 59
End: 2024-02-12
Payer: MEDICARE

## 2024-02-12 VITALS
SYSTOLIC BLOOD PRESSURE: 153 MMHG | BODY MASS INDEX: 29.16 KG/M2 | HEART RATE: 100 BPM | HEIGHT: 73 IN | WEIGHT: 220 LBS | TEMPERATURE: 98 F | DIASTOLIC BLOOD PRESSURE: 105 MMHG

## 2024-02-12 DIAGNOSIS — E66.9 OBESITY, UNSPECIFIED: ICD-10-CM

## 2024-02-12 DIAGNOSIS — I10 ESSENTIAL (PRIMARY) HYPERTENSION: ICD-10-CM

## 2024-02-12 DIAGNOSIS — E11.9 TYPE 2 DIABETES MELLITUS W/OUT COMPLICATIONS: ICD-10-CM

## 2024-02-12 PROCEDURE — 99205 OFFICE O/P NEW HI 60 MIN: CPT | Mod: 25

## 2024-02-12 PROCEDURE — 93000 ELECTROCARDIOGRAM COMPLETE: CPT

## 2024-02-12 RX ORDER — SPIRONOLACTONE 25 MG/1
25 TABLET ORAL
Qty: 90 | Refills: 0 | Status: COMPLETED | COMMUNITY
Start: 2020-03-26 | End: 2024-02-12

## 2024-02-12 RX ORDER — ACETIC ACID 20 MG/ML
2 SOLUTION AURICULAR (OTIC)
Qty: 2 | Refills: 0 | Status: COMPLETED | COMMUNITY
Start: 2020-04-06 | End: 2024-02-12

## 2024-02-12 RX ORDER — FINASTERIDE 5 MG/1
5 TABLET, FILM COATED ORAL DAILY
Qty: 90 | Refills: 3 | Status: COMPLETED | COMMUNITY
Start: 2022-06-15 | End: 2024-02-12

## 2024-02-12 RX ORDER — FLUOCINOLONE ACETONIDE 0.11 MG/ML
0.01 OIL AURICULAR (OTIC)
Qty: 2 | Refills: 0 | Status: COMPLETED | COMMUNITY
Start: 2020-08-19 | End: 2024-02-12

## 2024-02-12 RX ORDER — CEFUROXIME AXETIL 500 MG/1
500 TABLET ORAL
Qty: 20 | Refills: 0 | Status: COMPLETED | COMMUNITY
Start: 2020-10-19 | End: 2024-02-12

## 2024-02-12 RX ORDER — FLUOCINOLONE ACETONIDE 0.11 MG/ML
0.01 OIL AURICULAR (OTIC)
Qty: 1 | Refills: 3 | Status: COMPLETED | COMMUNITY
Start: 2021-11-15 | End: 2024-02-12

## 2024-02-12 RX ORDER — CIPROFLOXACIN HYDROCHLORIDE 500 MG/1
500 TABLET, FILM COATED ORAL
Qty: 2 | Refills: 0 | Status: COMPLETED | COMMUNITY
Start: 2021-04-26 | End: 2024-02-12

## 2024-02-12 RX ORDER — DULOXETINE HYDROCHLORIDE 30 MG/1
30 CAPSULE, DELAYED RELEASE PELLETS ORAL
Qty: 30 | Refills: 0 | Status: COMPLETED | COMMUNITY
Start: 2021-02-02 | End: 2024-02-12

## 2024-02-12 RX ORDER — ALPRAZOLAM 1 MG/1
1 TABLET ORAL
Qty: 1 | Refills: 0 | Status: COMPLETED | COMMUNITY
Start: 2020-09-21 | End: 2024-02-12

## 2024-02-12 RX ORDER — AZELASTINE HYDROCHLORIDE 137 UG/1
137 SPRAY, METERED NASAL DAILY
Qty: 1 | Refills: 3 | Status: COMPLETED | COMMUNITY
Start: 2021-11-15 | End: 2024-02-12

## 2024-02-12 RX ORDER — AZITHROMYCIN 250 MG/1
250 TABLET, FILM COATED ORAL
Qty: 6 | Refills: 0 | Status: COMPLETED | COMMUNITY
Start: 2021-01-05 | End: 2024-02-12

## 2024-02-12 RX ORDER — LEVOFLOXACIN 750 MG/1
750 TABLET, FILM COATED ORAL
Qty: 5 | Refills: 0 | Status: COMPLETED | COMMUNITY
Start: 2019-12-03 | End: 2024-02-12

## 2024-02-12 RX ORDER — COLISTIN SULFATE, NEOMYCIN SULFATE, THONZONIUM BROMIDE AND HYDROCORTISONE ACETATE 3; 3.3; .5; 1 MG/ML; MG/ML; MG/ML; MG/ML
3.3-3-10-0.5 SUSPENSION AURICULAR (OTIC)
Qty: 1 | Refills: 0 | Status: COMPLETED | COMMUNITY
Start: 2020-08-19 | End: 2024-02-12

## 2024-02-12 RX ORDER — OFLOXACIN OTIC 3 MG/ML
0.3 SOLUTION AURICULAR (OTIC)
Qty: 2 | Refills: 0 | Status: COMPLETED | COMMUNITY
Start: 2020-04-17 | End: 2024-02-12

## 2024-02-12 RX ORDER — PROMETHAZINE HYDROCHLORIDE AND DEXTROMETHORPHAN HYDROBROMIDE ORAL SOLUTION 15; 6.25 MG/5ML; MG/5ML
6.25-15 SOLUTION ORAL
Qty: 240 | Refills: 0 | Status: COMPLETED | COMMUNITY
Start: 2019-12-03 | End: 2024-02-12

## 2024-02-12 RX ORDER — MELOXICAM 15 MG/1
15 TABLET ORAL
Qty: 30 | Refills: 0 | Status: COMPLETED | COMMUNITY
Start: 2019-10-25 | End: 2024-02-12

## 2024-02-12 RX ORDER — ALBUTEROL SULFATE 90 UG/1
108 (90 BASE) INHALANT RESPIRATORY (INHALATION)
Qty: 1 | Refills: 0 | Status: COMPLETED | COMMUNITY
Start: 2019-11-22 | End: 2024-02-12

## 2024-02-12 RX ORDER — DEXAMETHASONE 6 MG/1
6 TABLET ORAL
Qty: 5 | Refills: 0 | Status: COMPLETED | COMMUNITY
Start: 2021-01-12 | End: 2024-02-12

## 2024-02-12 RX ORDER — DOXYCYCLINE 100 MG/1
100 TABLET, FILM COATED ORAL
Qty: 22 | Refills: 0 | Status: COMPLETED | COMMUNITY
Start: 2021-01-12 | End: 2024-02-12

## 2024-02-12 RX ORDER — CLOTRIMAZOLE 10 MG/ML
1 SOLUTION TOPICAL
Qty: 1 | Refills: 0 | Status: COMPLETED | COMMUNITY
Start: 2020-04-29 | End: 2024-02-12

## 2024-02-12 RX ORDER — PEN NEEDLE, DIABETIC 29 G X1/2"
31G X 8 MM NEEDLE, DISPOSABLE MISCELLANEOUS
Qty: 100 | Refills: 0 | Status: COMPLETED | COMMUNITY
Start: 2021-01-31 | End: 2024-02-12

## 2024-02-12 RX ORDER — CARIPRAZINE 3 MG/1
3 CAPSULE, GELATIN COATED ORAL
Qty: 30 | Refills: 0 | Status: COMPLETED | COMMUNITY
Start: 2020-10-12 | End: 2024-02-12

## 2024-02-12 RX ORDER — NEOMYCIN AND POLYMYXIN B SULFATES AND HYDROCORTISONE OTIC 10; 3.5; 1 MG/ML; MG/ML; [USP'U]/ML
3.5-10000-1 SUSPENSION AURICULAR (OTIC)
Qty: 10 | Refills: 0 | Status: COMPLETED | COMMUNITY
Start: 2020-08-19 | End: 2024-02-12

## 2024-02-12 RX ORDER — CYCLOBENZAPRINE HYDROCHLORIDE 10 MG/1
10 TABLET, FILM COATED ORAL
Qty: 30 | Refills: 0 | Status: COMPLETED | COMMUNITY
Start: 2020-09-25 | End: 2024-02-12

## 2024-02-12 RX ORDER — TOBRAMYCIN AND DEXAMETHASONE 3; 1 MG/ML; MG/ML
0.3-0.1 SUSPENSION/ DROPS OPHTHALMIC
Qty: 5 | Refills: 0 | Status: COMPLETED | COMMUNITY
Start: 2020-11-23 | End: 2024-02-12

## 2024-02-12 RX ORDER — TRAZODONE HYDROCHLORIDE 50 MG/1
50 TABLET ORAL
Qty: 30 | Refills: 0 | Status: COMPLETED | COMMUNITY
Start: 2020-10-12 | End: 2024-02-12

## 2024-02-12 RX ORDER — NITROFURANTOIN (MONOHYDRATE/MACROCRYSTALS) 25; 75 MG/1; MG/1
100 CAPSULE ORAL
Qty: 14 | Refills: 0 | Status: COMPLETED | COMMUNITY
Start: 2019-10-28 | End: 2024-02-12

## 2024-02-12 RX ORDER — NEOMYCIN SULFATE, POLYMYXIN B SULFATE, HYDROCORTISONE 3.5; 10000; 1 MG/ML; [USP'U]/ML; MG/ML
1 SOLUTION/ DROPS AURICULAR (OTIC)
Qty: 10 | Refills: 0 | Status: COMPLETED | COMMUNITY
Start: 2020-12-07 | End: 2024-02-12

## 2024-02-13 PROBLEM — E66.9 OBESITY: Status: ACTIVE | Noted: 2020-12-11

## 2024-02-13 PROBLEM — E11.9 DIABETES: Status: ACTIVE | Noted: 2021-03-12

## 2024-02-13 NOTE — DISCUSSION/SUMMARY
[EKG obtained to assist in diagnosis and management of assessed problem(s)] : EKG obtained to assist in diagnosis and management of assessed problem(s) [FreeTextEntry1] : Mr. Arie Apple is a pleasant 58-year-old man with hypertension, hyperlipidemia, cardiomyopathy with EF of 45-50%, moderate mitral regurgitation, s/p pectus excavatum surgical repair in 1979, and persistent atrial fibrillation. Patient was diagnosed with persistent atrial fibrillation in September 2023 and underwent HUSSAIN and cardioversion. He had a recurrence of atrial fibrillation soon after cardioversion. His CHADSVASC score is 3 (HTN, DM, CM). He is on Eliquis for stroke prevention.     I recommend continuing Eliquis for stroke prevention.    I recommend continuing the same medication for the treatment of hypertension.    I recommend continuing Metoprolol for rate control.    I discussed with patient at length lifestyle modifications for the prevention of atrial fibrillation, including weight loss, exercise, a healthy diet, checking for and treating sleep apnea, and avoiding alcohol.     The management strategies for atrial fibrillation were discussed focusing on the issues of stroke prevention, heart rate control and rhythm control. The options of rate control, antiarrhythmic drug therapy, and electrophysiologic testing and catheter ablation therapy were discussed at length. As he is not keen on long term antiarrhythmic medication, he is a good candidate for catheter ablation of atrial fibrillation in the form of pulmonary vein isolation. I have discussed the procedure with him in great detail. He was told this procedure is performed under anesthesia with the duration of about four hours. Possible complications include but not limited to bleeding, vascular injury, groin complications, cardiac tamponade, stroke, esophageal injury, pulmonary vein stenosis, need for pacemaker, need for cardiac surgery, and rare risks of esophageal fistula/stroke/heart attack/death. Intracardiac echo is used to monitor the procedure. In addition, we use a temperature probe in the esophagus to prevent lesions. The success rate is in the range 70-80%. About 20% of patients require a second procedure for pulmonary vein reconnection.   Procedure will be planned on 04/03/2024 (RF, Biosense, PVI, PWI, ?CTI).     He verbalized understanding of the discussion, and all questions were addressed and answered. He expressed agreement in proceeding with EP study and ablation. My  will be in contact with her for finalizing date, preadmission testing and instructions. Patient will follow with me in 2 months' time. Please do not hesitate to contact me at [phone] if you have any further questions regarding this patient care.

## 2024-02-13 NOTE — ADDENDUM
[FreeTextEntry1] : Abena MARTINES assisted in documentation on 02/13/2024   acting as a scribe for Dr. Jamie Mendoza.

## 2024-02-13 NOTE — HISTORY OF PRESENT ILLNESS
[FreeTextEntry1] : Hypertension, hyperlipidemia, diabetes mellitus, cardiomyopathy with EF 45-50%, moderate mitral regurgitation, s/p pectus excavatum surgical repair in 1979, and persistent atrial fibrillation.    Patient was diagnosed with persistent atrial fibrillation in September 2023. He underwent a successful HUSSAIN and cardioversion with Dr. Zavala. He had a recurrence of atrial fibrillation after cardioversion. He was treated transiently with Amiodarone, but Amiodarone was stopped subsequently.     Patient was admitted to [facility] in November 2023 and was diagnosed with pneumonia. He was treated with antibiotics and atrial fibrillation was rate controlled.    Patient remained in atrial fibrillation with rate fairly controlled. He is on Eliquis for stroke prevention.    Patient has no chest pain, no shortness of breath at rest, mild dyspnea on exertion, no dizziness, no lightheadedness, and no syncope. He reports increased fatigue. He presents for evaluation.

## 2024-02-13 NOTE — CARDIOLOGY SUMMARY
[de-identified] : (02/12/2024): ECG. Atrial fibrillation at 100 bpm, nonspecific T wave abnormalities.   [de-identified] : (12/28/2023): 2D echo at Dr. Long' office. EF 45-50%. Moderate MR, eccentric. Could be underestimated.    (11/30/2023): 2D echo at [facility]. Technically limited study. LVEF grossly preserved. Some septal dyssynchrony. Moderate LA dilatation. Mild RV dilatation. Mild RV systolic dysfunction. Mild MR. Mild TR. No AI.    [de-identified] : (09/2023): HUSSAIN and DC cardioversion with Dr. Zavala.

## 2024-02-13 NOTE — END OF VISIT
[Time Spent: ___ minutes] : I have spent [unfilled] minutes of time on the encounter. [FreeTextEntry3] : I, Jamie Mendoza, personally performed the services described in this documentation. All medical record entries made by the scribe/nurse CTA were at my direction and in my presence. I have reviewed the chart and agree that the record reflects my personal performance and is accurate and complete.

## 2024-02-13 NOTE — REASON FOR VISIT
[Arrhythmia/ECG Abnorrmalities] : arrhythmia/ECG abnormalities [FreeTextEntry3] : Dr. Gonzalez Long - Dr. Kyle Haddad

## 2024-02-28 NOTE — ED ADULT NURSE NOTE - HOW OFTEN DO YOU HAVE A DRINK CONTAINING ALCOHOL?
Caller: Isidoro Newman JR    Relationship: Self    Best call back number: [phone]     Requested Prescriptions:   Requested Prescriptions     Pending Prescriptions Disp Refills    albuterol sulfate  (90 Base) MCG/ACT inhaler 18 g 3     Sig: Inhale 2 puffs Every 4 (Four) Hours As Needed for Wheezing.        Pharmacy where request should be sent: [id number] - [facility] [address] - [phone] [facility] [phone] FX     Last office visit with prescribing clinician: 7/21/2023   Last telemedicine visit with prescribing clinician: Visit date not found   Next office visit with prescribing clinician: Visit date not found     Additional details provided by patient: PATIENT STATED HE HAS ABOUT 15 PUFFS REMAINING.    PATIENT STATED HE DOES NOT NEED TO USE THIS MEDICATION AS OFTEN AS HE USED, HOWEVER HE DOES STILL USE IT LIKE A RESCUE INHALER.    [facility], AND THIS MUST BE CALLED IN.    PLEASE DO NOT E-PRESCRIBE THIS PRESCRIPTION AS IT WILL NOT GO THROUGH.    Does the patient have less than a 3 day supply:  [] Yes  [x] No    Would you like a call back once the refill request has been completed: [x] Yes [] No    If the office needs to give you a call back, can they leave a voicemail: [x] Yes [] No    Adelfo Corbin Rep   02/28/24 13:06 EST    Never

## 2024-03-01 ENCOUNTER — APPOINTMENT (OUTPATIENT)
Dept: PULMONOLOGY | Facility: CLINIC | Age: 59
End: 2024-03-01

## 2024-03-21 ENCOUNTER — RESULT REVIEW (OUTPATIENT)
Age: 59
End: 2024-03-21

## 2024-03-21 ENCOUNTER — OUTPATIENT (OUTPATIENT)
Dept: OUTPATIENT SERVICES | Facility: HOSPITAL | Age: 59
LOS: 1 days | End: 2024-03-21
Payer: MEDICARE

## 2024-03-21 VITALS
SYSTOLIC BLOOD PRESSURE: 122 MMHG | TEMPERATURE: 97 F | RESPIRATION RATE: 16 BRPM | WEIGHT: 220.02 LBS | DIASTOLIC BLOOD PRESSURE: 70 MMHG | HEIGHT: 74 IN | OXYGEN SATURATION: 97 % | HEART RATE: 76 BPM

## 2024-03-21 DIAGNOSIS — Z01.818 ENCOUNTER FOR OTHER PREPROCEDURAL EXAMINATION: ICD-10-CM

## 2024-03-21 DIAGNOSIS — Z92.89 PERSONAL HISTORY OF OTHER MEDICAL TREATMENT: Chronic | ICD-10-CM

## 2024-03-21 DIAGNOSIS — I48.19 OTHER PERSISTENT ATRIAL FIBRILLATION: ICD-10-CM

## 2024-03-21 LAB
ALBUMIN SERPL ELPH-MCNC: 4.3 G/DL — SIGNIFICANT CHANGE UP (ref 3.5–5.2)
ALP SERPL-CCNC: 102 U/L — SIGNIFICANT CHANGE UP (ref 30–115)
ALT FLD-CCNC: 24 U/L — SIGNIFICANT CHANGE UP (ref 0–41)
ANION GAP SERPL CALC-SCNC: 10 MMOL/L — SIGNIFICANT CHANGE UP (ref 7–14)
AST SERPL-CCNC: 16 U/L — SIGNIFICANT CHANGE UP (ref 0–41)
BASOPHILS # BLD AUTO: 0.04 K/UL — SIGNIFICANT CHANGE UP (ref 0–0.2)
BASOPHILS NFR BLD AUTO: 0.6 % — SIGNIFICANT CHANGE UP (ref 0–1)
BILIRUB SERPL-MCNC: 1.1 MG/DL — SIGNIFICANT CHANGE UP (ref 0.2–1.2)
BLD GP AB SCN SERPL QL: SIGNIFICANT CHANGE UP
BUN SERPL-MCNC: 14 MG/DL — SIGNIFICANT CHANGE UP (ref 10–20)
CALCIUM SERPL-MCNC: 8.8 MG/DL — SIGNIFICANT CHANGE UP (ref 8.4–10.5)
CHLORIDE SERPL-SCNC: 106 MMOL/L — SIGNIFICANT CHANGE UP (ref 98–110)
CO2 SERPL-SCNC: 24 MMOL/L — SIGNIFICANT CHANGE UP (ref 17–32)
CREAT SERPL-MCNC: 0.7 MG/DL — SIGNIFICANT CHANGE UP (ref 0.7–1.5)
EGFR: 107 ML/MIN/1.73M2 — SIGNIFICANT CHANGE UP
EOSINOPHIL # BLD AUTO: 0.14 K/UL — SIGNIFICANT CHANGE UP (ref 0–0.7)
EOSINOPHIL NFR BLD AUTO: 1.9 % — SIGNIFICANT CHANGE UP (ref 0–8)
GLUCOSE SERPL-MCNC: 162 MG/DL — HIGH (ref 70–99)
HCT VFR BLD CALC: 45.4 % — SIGNIFICANT CHANGE UP (ref 42–52)
HGB BLD-MCNC: 15.5 G/DL — SIGNIFICANT CHANGE UP (ref 14–18)
IMM GRANULOCYTES NFR BLD AUTO: 0.4 % — HIGH (ref 0.1–0.3)
LYMPHOCYTES # BLD AUTO: 2.78 K/UL — SIGNIFICANT CHANGE UP (ref 1.2–3.4)
LYMPHOCYTES # BLD AUTO: 38.6 % — SIGNIFICANT CHANGE UP (ref 20.5–51.1)
MCHC RBC-ENTMCNC: 28.2 PG — SIGNIFICANT CHANGE UP (ref 27–31)
MCHC RBC-ENTMCNC: 34.1 G/DL — SIGNIFICANT CHANGE UP (ref 32–37)
MCV RBC AUTO: 82.7 FL — SIGNIFICANT CHANGE UP (ref 80–94)
MONOCYTES # BLD AUTO: 0.54 K/UL — SIGNIFICANT CHANGE UP (ref 0.1–0.6)
MONOCYTES NFR BLD AUTO: 7.5 % — SIGNIFICANT CHANGE UP (ref 1.7–9.3)
NEUTROPHILS # BLD AUTO: 3.67 K/UL — SIGNIFICANT CHANGE UP (ref 1.4–6.5)
NEUTROPHILS NFR BLD AUTO: 51 % — SIGNIFICANT CHANGE UP (ref 42.2–75.2)
NRBC # BLD: 0 /100 WBCS — SIGNIFICANT CHANGE UP (ref 0–0)
PLATELET # BLD AUTO: 180 K/UL — SIGNIFICANT CHANGE UP (ref 130–400)
PMV BLD: 12.3 FL — HIGH (ref 7.4–10.4)
POTASSIUM SERPL-MCNC: 4.2 MMOL/L — SIGNIFICANT CHANGE UP (ref 3.5–5)
POTASSIUM SERPL-SCNC: 4.2 MMOL/L — SIGNIFICANT CHANGE UP (ref 3.5–5)
PROT SERPL-MCNC: 7 G/DL — SIGNIFICANT CHANGE UP (ref 6–8)
RBC # BLD: 5.49 M/UL — SIGNIFICANT CHANGE UP (ref 4.7–6.1)
RBC # FLD: 14.1 % — SIGNIFICANT CHANGE UP (ref 11.5–14.5)
SODIUM SERPL-SCNC: 140 MMOL/L — SIGNIFICANT CHANGE UP (ref 135–146)
WBC # BLD: 7.2 K/UL — SIGNIFICANT CHANGE UP (ref 4.8–10.8)
WBC # FLD AUTO: 7.2 K/UL — SIGNIFICANT CHANGE UP (ref 4.8–10.8)

## 2024-03-21 PROCEDURE — 86850 RBC ANTIBODY SCREEN: CPT

## 2024-03-21 PROCEDURE — 85025 COMPLETE CBC W/AUTO DIFF WBC: CPT

## 2024-03-21 PROCEDURE — 36415 COLL VENOUS BLD VENIPUNCTURE: CPT

## 2024-03-21 PROCEDURE — 80053 COMPREHEN METABOLIC PANEL: CPT

## 2024-03-21 PROCEDURE — 93005 ELECTROCARDIOGRAM TRACING: CPT

## 2024-03-21 PROCEDURE — 86901 BLOOD TYPING SEROLOGIC RH(D): CPT

## 2024-03-21 PROCEDURE — 71046 X-RAY EXAM CHEST 2 VIEWS: CPT

## 2024-03-21 PROCEDURE — 86900 BLOOD TYPING SEROLOGIC ABO: CPT

## 2024-03-21 PROCEDURE — 93010 ELECTROCARDIOGRAM REPORT: CPT

## 2024-03-21 PROCEDURE — 71046 X-RAY EXAM CHEST 2 VIEWS: CPT | Mod: 26

## 2024-03-21 PROCEDURE — 99214 OFFICE O/P EST MOD 30 MIN: CPT | Mod: 25

## 2024-03-21 RX ORDER — INSULIN DEGLUDEC 100 U/ML
80 INJECTION, SOLUTION SUBCUTANEOUS
Qty: 0 | Refills: 0 | DISCHARGE

## 2024-03-21 RX ORDER — REPAGLINIDE 1 MG/1
1 TABLET ORAL
Qty: 0 | Refills: 0 | DISCHARGE

## 2024-03-21 RX ORDER — ATORVASTATIN CALCIUM 80 MG/1
1 TABLET, FILM COATED ORAL
Qty: 0 | Refills: 0 | DISCHARGE

## 2024-03-21 NOTE — H&P PST ADULT - HISTORY OF PRESENT ILLNESS
57 Y/O MALE PT TO PAST WITH HX              PT NOW FOR SCHEDULED PROCEDURE. PT DENIES ANY CP SOB PALP COUGH DYSURIA FEVER URI. PT ABLE TO ORVILLE 1-2 FOS W/O SOB  Anesthesia Alert  NO--Difficult Airway  NO--History of neck surgery or radiation  NO--Limited ROM of neck  NO--History of Malignant hyperthermia  NO--Personal or family history of Pseudocholinesterase deficiency.  NO--Prior Anesthesia Complication  NO--Latex Allergy  NO--Loose teeth  NO--History of Rheumatoid Arthritis  NO--ZARIA  NO--Bleeding risk  NO--Other_____   59 Y/O MALE PT TO PAST WITH HX C/O INCREASED PALP , DENIES CP FOR PAST 6 MO  PT NOW FOR SCHEDULED PROCEDURE ( CARDIOVERSION) . PT DENIES ANY CP SOB PALP COUGH DYSURIA FEVER URI.   Anesthesia Alert  CLASS III  NO--History of neck surgery or radiation  NO--Limited ROM of neck  NO--History of Malignant hyperthermia  NO--Personal or family history of Pseudocholinesterase deficiency.  NO--Prior Anesthesia Complication  NO--Latex Allergy  NO--Loose teeth  NO--History of Rheumatoid Arthritis  NO--ZARIA  NO--Bleeding risk  NO--Other_____  Revised Cardiac Risk Index for Pre-Operative Risk from tabulate  on 3/21/2024  ** All calculations should be rechecked by clinician prior to use **    RESULT SUMMARY:  1 points  Class II Risk    6.0 %  30-day risk of death, MI, or cardiac arrest    From Duceppe 2017. These numbers are higher than those from the original study (Ra 1999). See Evidence for details.      INPUTS:  Elevated-risk surgery —> 0 = No  History of ischemic heart disease —> 0 = No  History of congestive heart failure —> 0 = No  History of cerebrovascular disease —> 0 = No  Pre-operative treatment with insulin —> 1 = Yes  Pre-operative creatinine >2 mg/dL / 176.8 µmol/L —> 0 = No  Duke Activity Status Index (DASI) from tabulate  on 3/21/2024  ** All calculations should be rechecked by clinician prior to use **    RESULT SUMMARY:  18.7 points  The higher the score (maximum 58.2), the higher the functional status.    5.04 METs        INPUTS:  Take care of self —> 2.75 = Yes  Walk indoors —> 1.75 = Yes  Walk 1&ndash;2 blocks on level ground —> 2.75 = Yes  Climb a flight of stairs or walk up a hill —> 0 = No  Run a short distance —> 0 = No  Do light work around the house —> 2.7 = Yes  Do moderate work around the house —> 3.5 = Yes  Do heavy work around the house —> 0 = No  Do yardwork —> 0 = No  Have sexual relations —> 5.25 = Yes  Participate in moderate recreational activities —> 0 = No  Participate in strenuous sports —> 0 = No

## 2024-03-21 NOTE — H&P PST ADULT - NSICDXPASTSURGICALHX_GEN_ALL_CORE_FT
PAST SURGICAL HISTORY:  Pectus excavatum      PAST SURGICAL HISTORY:  History of cardioversion     Pectus excavatum

## 2024-03-21 NOTE — H&P PST ADULT - NSANTHOSAYNRD_GEN_A_CORE
No. ZARIA screening performed.  STOP BANG Legend: 0-2 = LOW Risk; 3-4 = INTERMEDIATE Risk; 5-8 = HIGH Risk

## 2024-03-21 NOTE — H&P PST ADULT - NSICDXFAMILYHX_GEN_ALL_CORE_FT
FAMILY HISTORY:  Mother  Still living? Unknown  Family history of diabetes mellitus (DM), Age at diagnosis: Age Unknown  FH: CAD (coronary artery disease), Age at diagnosis: Age Unknown    Sibling  Still living? Unknown  Family history of diabetes mellitus (DM), Age at diagnosis: Age Unknown

## 2024-03-22 DIAGNOSIS — Z01.818 ENCOUNTER FOR OTHER PREPROCEDURAL EXAMINATION: ICD-10-CM

## 2024-03-22 DIAGNOSIS — I48.19 OTHER PERSISTENT ATRIAL FIBRILLATION: ICD-10-CM

## 2024-04-04 ENCOUNTER — TRANSCRIPTION ENCOUNTER (OUTPATIENT)
Age: 59
End: 2024-04-04

## 2024-04-04 ENCOUNTER — RESULT REVIEW (OUTPATIENT)
Age: 59
End: 2024-04-04

## 2024-04-04 ENCOUNTER — OUTPATIENT (OUTPATIENT)
Dept: OUTPATIENT SERVICES | Facility: HOSPITAL | Age: 59
LOS: 1 days | Discharge: HOME | End: 2024-04-04
Payer: MEDICARE

## 2024-04-04 ENCOUNTER — APPOINTMENT (OUTPATIENT)
Dept: ELECTROPHYSIOLOGY | Facility: HOSPITAL | Age: 59
End: 2024-04-04

## 2024-04-04 VITALS — WEIGHT: 220.02 LBS | HEIGHT: 74 IN

## 2024-04-04 DIAGNOSIS — I48.19 OTHER PERSISTENT ATRIAL FIBRILLATION: ICD-10-CM

## 2024-04-04 DIAGNOSIS — I48.3 TYPICAL ATRIAL FLUTTER: ICD-10-CM

## 2024-04-04 DIAGNOSIS — Q67.6 PECTUS EXCAVATUM: Chronic | ICD-10-CM

## 2024-04-04 DIAGNOSIS — Z92.89 PERSONAL HISTORY OF OTHER MEDICAL TREATMENT: Chronic | ICD-10-CM

## 2024-04-04 LAB
BLD GP AB SCN SERPL QL: SIGNIFICANT CHANGE UP
GLUCOSE BLDC GLUCOMTR-MCNC: 136 MG/DL — HIGH (ref 70–99)
GLUCOSE BLDC GLUCOMTR-MCNC: 147 MG/DL — HIGH (ref 70–99)
GLUCOSE BLDC GLUCOMTR-MCNC: 151 MG/DL — HIGH (ref 70–99)
GLUCOSE BLDC GLUCOMTR-MCNC: 174 MG/DL — HIGH (ref 70–99)

## 2024-04-04 PROCEDURE — 93623 PRGRMD STIMJ&PACG IV RX NFS: CPT

## 2024-04-04 PROCEDURE — C9399: CPT

## 2024-04-04 PROCEDURE — C1894: CPT

## 2024-04-04 PROCEDURE — 93655 ICAR CATH ABLTJ DSCRT ARRHYT: CPT

## 2024-04-04 PROCEDURE — C1732: CPT

## 2024-04-04 PROCEDURE — 93318 ECHO TRANSESOPHAGEAL INTRAOP: CPT

## 2024-04-04 PROCEDURE — C1769: CPT

## 2024-04-04 PROCEDURE — 93657 TX L/R ATRIAL FIB ADDL: CPT

## 2024-04-04 PROCEDURE — C1759: CPT

## 2024-04-04 PROCEDURE — 86850 RBC ANTIBODY SCREEN: CPT

## 2024-04-04 PROCEDURE — 71046 X-RAY EXAM CHEST 2 VIEWS: CPT

## 2024-04-04 PROCEDURE — 82962 GLUCOSE BLOOD TEST: CPT

## 2024-04-04 PROCEDURE — 93656 COMPRE EP EVAL ABLTJ ATR FIB: CPT

## 2024-04-04 PROCEDURE — 86900 BLOOD TYPING SEROLOGIC ABO: CPT

## 2024-04-04 PROCEDURE — 86901 BLOOD TYPING SEROLOGIC RH(D): CPT

## 2024-04-04 PROCEDURE — ZZZZZ: CPT

## 2024-04-04 PROCEDURE — 93005 ELECTROCARDIOGRAM TRACING: CPT

## 2024-04-04 PROCEDURE — C1766: CPT

## 2024-04-04 PROCEDURE — 36415 COLL VENOUS BLD VENIPUNCTURE: CPT

## 2024-04-04 RX ORDER — EMPAGLIFLOZIN 10 MG/1
1 TABLET, FILM COATED ORAL
Qty: 0 | Refills: 0 | DISCHARGE

## 2024-04-04 RX ORDER — DEXTROSE 50 % IN WATER 50 %
25 SYRINGE (ML) INTRAVENOUS ONCE
Refills: 0 | Status: DISCONTINUED | OUTPATIENT
Start: 2024-04-04 | End: 2024-04-05

## 2024-04-04 RX ORDER — SEMAGLUTIDE 0.68 MG/ML
1 INJECTION, SOLUTION SUBCUTANEOUS
Qty: 0 | Refills: 0 | DISCHARGE

## 2024-04-04 RX ORDER — LISINOPRIL 2.5 MG/1
0.5 TABLET ORAL
Refills: 0 | DISCHARGE

## 2024-04-04 RX ORDER — FAMOTIDINE 10 MG/ML
20 INJECTION INTRAVENOUS ONCE
Refills: 0 | Status: COMPLETED | OUTPATIENT
Start: 2024-04-04 | End: 2024-04-04

## 2024-04-04 RX ORDER — DEXTROSE 50 % IN WATER 50 %
12.5 SYRINGE (ML) INTRAVENOUS ONCE
Refills: 0 | Status: DISCONTINUED | OUTPATIENT
Start: 2024-04-04 | End: 2024-04-05

## 2024-04-04 RX ORDER — SODIUM CHLORIDE 9 MG/ML
1000 INJECTION, SOLUTION INTRAVENOUS
Refills: 0 | Status: DISCONTINUED | OUTPATIENT
Start: 2024-04-04 | End: 2024-04-05

## 2024-04-04 RX ORDER — INSULIN LISPRO 100/ML
VIAL (ML) SUBCUTANEOUS AT BEDTIME
Refills: 0 | Status: DISCONTINUED | OUTPATIENT
Start: 2024-04-04 | End: 2024-04-05

## 2024-04-04 RX ORDER — ALBUTEROL 90 UG/1
1 AEROSOL, METERED ORAL
Qty: 0 | Refills: 0 | DISCHARGE

## 2024-04-04 RX ORDER — FAMOTIDINE 10 MG/ML
40 INJECTION INTRAVENOUS DAILY
Refills: 0 | Status: DISCONTINUED | OUTPATIENT
Start: 2024-04-05 | End: 2024-04-05

## 2024-04-04 RX ORDER — DEXTROSE 10 % IN WATER 10 %
125 INTRAVENOUS SOLUTION INTRAVENOUS ONCE
Refills: 0 | Status: DISCONTINUED | OUTPATIENT
Start: 2024-04-04 | End: 2024-04-05

## 2024-04-04 RX ORDER — GLUCAGON INJECTION, SOLUTION 0.5 MG/.1ML
1 INJECTION, SOLUTION SUBCUTANEOUS ONCE
Refills: 0 | Status: DISCONTINUED | OUTPATIENT
Start: 2024-04-04 | End: 2024-04-05

## 2024-04-04 RX ORDER — APIXABAN 2.5 MG/1
5 TABLET, FILM COATED ORAL EVERY 12 HOURS
Refills: 0 | Status: DISCONTINUED | OUTPATIENT
Start: 2024-04-04 | End: 2024-04-05

## 2024-04-04 RX ORDER — DEXTROSE 50 % IN WATER 50 %
15 SYRINGE (ML) INTRAVENOUS ONCE
Refills: 0 | Status: DISCONTINUED | OUTPATIENT
Start: 2024-04-04 | End: 2024-04-05

## 2024-04-04 RX ORDER — INSULIN LISPRO 100/ML
VIAL (ML) SUBCUTANEOUS
Refills: 0 | Status: DISCONTINUED | OUTPATIENT
Start: 2024-04-04 | End: 2024-04-05

## 2024-04-04 RX ORDER — INSULIN GLARGINE 100 [IU]/ML
40 INJECTION, SOLUTION SUBCUTANEOUS AT BEDTIME
Refills: 0 | Status: DISCONTINUED | OUTPATIENT
Start: 2024-04-04 | End: 2024-04-05

## 2024-04-04 RX ADMIN — FAMOTIDINE 20 MILLIGRAM(S): 10 INJECTION INTRAVENOUS at 18:32

## 2024-04-04 RX ADMIN — APIXABAN 5 MILLIGRAM(S): 2.5 TABLET, FILM COATED ORAL at 18:32

## 2024-04-04 NOTE — DISCHARGE NOTE PROVIDER - NSDCMRMEDTOKEN_GEN_ALL_CORE_FT
atorvastatin 10 mg oral tablet: 1 tab(s) orally  Eliquis 5 mg oral tablet: 1 tab(s) orally 2 times a day  furosemide 40 mg oral tablet: 1 tab(s) orally  lisinopril 5 mg oral tablet: 1 tab(s) orally once a day  metoprolol succinate 100 mg oral tablet, extended release: 1 tab(s) orally 2 times a day  Tresiba 100 units/mL subcutaneous solution: 40 subcutaneous once a day (at bedtime)   atorvastatin 10 mg oral tablet: 1 tab(s) orally  Eliquis 5 mg oral tablet: 1 tab(s) orally 2 times a day  furosemide 40 mg oral tablet: 1 tab(s) orally  lisinopril 10 mg oral tablet: 1 tab(s) orally once a day  metoprolol succinate 100 mg oral tablet, extended release: 1 tab(s) orally 2 times a day  Tresiba 100 units/mL subcutaneous solution: 40 subcutaneous once a day (at bedtime)   atorvastatin 10 mg oral tablet: 1 tab(s) orally once a day (at bedtime)  Eliquis 5 mg oral tablet: 1 tab(s) orally 2 times a day  furosemide 40 mg oral tablet: 1 tab(s) orally once a day  lisinopril 10 mg oral tablet: 1 tab(s) orally once a day  metoprolol succinate 100 mg oral tablet, extended release: 1 tab(s) orally 2 times a day  Pepcid 20 mg oral tablet: 1 tab(s) orally once a day  Tresiba 100 units/mL subcutaneous solution: 40 subcutaneous once a day (at bedtime)

## 2024-04-04 NOTE — DISCHARGE NOTE PROVIDER - ATTENDING DISCHARGE PHYSICAL EXAMINATION:
I saw and evaluated the patient the day of discharge.  Stable post elective AF ablation.  Plan for close follow up with EP.

## 2024-04-04 NOTE — DISCHARGE NOTE PROVIDER - CARE PROVIDER_API CALL
Jamie Mendoza  Cardiac Electrophysiology  [address] [place] [address], [address]  [place] [address]  Phone: [phone]  Fax: [phone]  Follow Up Time: 1 month

## 2024-04-04 NOTE — PATIENT PROFILE ADULT - FALL HARM RISK - RISK INTERVENTIONS

## 2024-04-04 NOTE — DISCHARGE NOTE PROVIDER - NSDCFUSCHEDAPPT_GEN_ALL_CORE_FT
[facility]  [facility] [address]  Scheduled Appointment: 05/06/2024

## 2024-04-04 NOTE — CHART NOTE - NSCHARTNOTEFT_GEN_A_CORE
Electrophysiology Brief Post-Op Note      I have personally seen and examined the patient.  I agree with the history and physical which I have reviewed and noted any changes below.     PRE-OP DIAGNOSIS:  -Persistent Atrial Fibrillation    POST-OP DIAGNOSIS:  -Persistent Atrial Fibrillation      PROCEDURE:  -Radiofrequency (RF) ablation of Atrial Fibrillation   -3D mapping   -Infusion of Medicine  -Transeptal puncture  -Use of intracardiac Echo  -HUSSAIN      Vascular Access used (using Ultrasound Guidance and micropuncture needle)  -Right Femoral Vein: 8F    -Left Femoral Vein: 11F 8F  -Right Femoral Artery: none    Physician: Kelly  Assistant: None    ANESTHESIA TYPE:  [X]General Anesthesia  [  ] Sedation  [X] Local/Regional      CONDITION  [  ] Critical  [  ] Serious  [  ]Fair  [X]Good      SPECIMENS REMOVED (IF APPLICABLE): NONE      IMPLANTS (IF APPLICABLE): NONE      COMPLICATIONS:  -No immediate complications      FINDINGS (see below)    -Successful radiofrequency (RF) ablation of Atrial Fibrillation in the form of:    1-Pulmonary Veins Isolation     2-Successful Posterior Wall Isolation    3-Successful CTI isthmus ablation    -No pericardial effusion on ICE  -All sheaths were removed and manual pressure applied    -ESTIMATED BLOOD LOSS:  15 mL  -Contrast Used: none  -Protamine: 60 mg       PLAN OF CARE  -	Start Eliquis 5 mg q12h continuous schedule  -             Start pepcid 20 mg daily - first dose today  -	Bed rest for 4 hours  -	Admit to telemetry
I saw and examined patient and I reviewed his chart and blood work. I attest that there has been no clinical change in patient's condition since last assessment documented in H&P, consult, or last office visit.

## 2024-04-04 NOTE — DISCHARGE NOTE PROVIDER - HOSPITAL COURSE
Patient is a 58y Male  with PMHx of HTN, HLd, DM, , CM with ef 45-50%, mod mitral regurg, s/p pectus excavatum surgical repair in 1979, and persistent afib on eliquis (diag sept 2023) , hx of successful karyn/dccv with Dr. Zavala, who presented to [facility] for elective  AF Ablation. On 4/4 patient underwent successful RFA for atrial fibrillation. Patient was monitored overnight. On POD 1 patient remains HD stable with no complaints. Patient remains in SR with no arrhythmias noted on tele. Examination of B/L common femoral venous access sites reveal a Clear and dry area with no hematoma or erythema. Patient should continue Eliquis for thromboembolic prophylaxis as well as pepcid 20mg daily for 1 month. Patient is being DC home in stable condition.

## 2024-04-04 NOTE — ASU PATIENT PROFILE, ADULT - FALL HARM RISK - RISK INTERVENTIONS
Magnesium on hold  Magnesium level drawn Communicate Fall Risk and Risk Factors to all staff, patient, and family/Reinforce activity limits and safety measures with patient and family/Visual Cue: Yellow wristband/Bed in lowest position, wheels locked, appropriate side rails in place/Call bell, personal items and telephone in reach/Instruct patient to call for assistance before getting out of bed or chair/Non-slip footwear when patient is out of bed/[place] to call system/Physically safe environment - no spills, clutter or unnecessary equipment/Purposeful Proactive Rounding/Room/bathroom lighting operational, light cord in reach

## 2024-04-04 NOTE — DISCHARGE NOTE PROVIDER - NSDCCPTREATMENT_GEN_ALL_CORE_FT
PRINCIPAL PROCEDURE  Procedure: Atrial ablation  Findings and Treatment: - Please start taking pepcid 20mg daily x 1 month  - You should continue taking  your Eliquis 5mg twice daily  - You may shower today  - Do not drive or operate heavy machinery for 3 days.  - Do not submerge in water (example: baths, swimming) for 1 week.  - No squatting, exertional activities, or lifting anything > 10 lbs for 1 week.  - Any sudden swelling, redness, fever, discharge, or severe pain, call the Electrophysiology Office at [phone].  - Followup with Dr. Mendoza in 1 month

## 2024-04-05 ENCOUNTER — TRANSCRIPTION ENCOUNTER (OUTPATIENT)
Age: 59
End: 2024-04-05

## 2024-04-05 VITALS
SYSTOLIC BLOOD PRESSURE: 143 MMHG | TEMPERATURE: 98 F | OXYGEN SATURATION: 95 % | HEART RATE: 98 BPM | RESPIRATION RATE: 20 BRPM | DIASTOLIC BLOOD PRESSURE: 92 MMHG

## 2024-04-05 LAB
GLUCOSE BLDC GLUCOMTR-MCNC: 129 MG/DL — HIGH (ref 70–99)
GLUCOSE BLDC GLUCOMTR-MCNC: 197 MG/DL — HIGH (ref 70–99)

## 2024-04-05 PROCEDURE — 93010 ELECTROCARDIOGRAM REPORT: CPT

## 2024-04-05 PROCEDURE — 71046 X-RAY EXAM CHEST 2 VIEWS: CPT | Mod: 26

## 2024-04-05 RX ORDER — FAMOTIDINE 10 MG/ML
1 INJECTION INTRAVENOUS
Qty: 30 | Refills: 0
Start: 2024-04-05 | End: 2024-05-04

## 2024-04-05 RX ORDER — FUROSEMIDE 40 MG
40 TABLET ORAL DAILY
Refills: 0 | Status: DISCONTINUED | OUTPATIENT
Start: 2024-04-05 | End: 2024-04-05

## 2024-04-05 RX ORDER — LISINOPRIL 2.5 MG/1
10 TABLET ORAL DAILY
Refills: 0 | Status: DISCONTINUED | OUTPATIENT
Start: 2024-04-05 | End: 2024-04-05

## 2024-04-05 RX ORDER — LISINOPRIL 2.5 MG/1
1 TABLET ORAL
Qty: 0 | Refills: 0 | DISCHARGE
Start: 2024-04-05

## 2024-04-05 RX ORDER — ATORVASTATIN CALCIUM 80 MG/1
10 TABLET, FILM COATED ORAL AT BEDTIME
Refills: 0 | Status: DISCONTINUED | OUTPATIENT
Start: 2024-04-05 | End: 2024-04-05

## 2024-04-05 RX ORDER — METOPROLOL TARTRATE 50 MG
100 TABLET ORAL DAILY
Refills: 0 | Status: DISCONTINUED | OUTPATIENT
Start: 2024-04-05 | End: 2024-04-05

## 2024-04-05 RX ORDER — LISINOPRIL 2.5 MG/1
1 TABLET ORAL
Refills: 0 | DISCHARGE

## 2024-04-05 RX ADMIN — FAMOTIDINE 40 MILLIGRAM(S): 10 INJECTION INTRAVENOUS at 11:49

## 2024-04-05 RX ADMIN — APIXABAN 5 MILLIGRAM(S): 2.5 TABLET, FILM COATED ORAL at 05:50

## 2024-04-05 RX ADMIN — Medication 100 MILLIGRAM(S): at 12:00

## 2024-04-05 RX ADMIN — Medication 2: at 11:50

## 2024-04-05 NOTE — PROGRESS NOTE ADULT - SUBJECTIVE AND OBJECTIVE BOX
Patient is a 58y old  Male who presents with a chief complaint of Afib Ablation (04 Apr 2024 21:41)    HPI: 58 year old male with the past medical history of hypertension, hyperlipidemia, diabetes mellitus atrial fibrillation is now status post successful radiofrequency (RF) ablation of Atrial Fibrillation in the form of: Pulmonary Veins Isolation, Successful Posterior Wall Isolation. Successful CTI isthmus ablation. No pericardial effusion on ICE      PAST MEDICAL & SURGICAL HISTORY:  Afib  HTN (hypertension)  HLD (hyperlipidemia)  Type 2 diabetes mellitus  Pectus excavatum  History of cardioversion      PREVIOUS DIAGNOSTIC TESTING:      ECHO  FINDINGS:  1. Technically difficult study.  2. Normal left ventricular size and systolic function. LV Ejection  Fraction by Jurado's Method with a biplane EF of 59 %, however this may  not be accurate due to abnormal rhythm. Concentric remodeling.  3. Regional wall motion abnormalities as described in body of report.  4. Normal right ventricular size and function.  5. Biatrial dilatation. 6. Mild mitral valve regurgitation.    STRESS  FINDINGS:  NORMAL RESTING MYOCARDIAL PERFUSION TOMOGRAPHIC IMAGES.  NOTE: In order to evaluate this patient for the possible presence of   inducible ischemia, stress or pharmacologic stress test will be necessary    CATHETERIZATION  FINDINGS:    MEDICATIONS  (STANDING):  apixaban 5 milliGRAM(s) Oral every 12 hours  atorvastatin 10 milliGRAM(s) Oral at bedtime  dextrose 10% Bolus 125 milliLiter(s) IV Bolus once  dextrose 5%. 1000 milliLiter(s) (100 mL/Hr) IV Continuous <Continuous>  dextrose 5%. 1000 milliLiter(s) (50 mL/Hr) IV Continuous <Continuous>  dextrose 50% Injectable 12.5 Gram(s) IV Push once  dextrose 50% Injectable 25 Gram(s) IV Push once  famotidine    Tablet 40 milliGRAM(s) Oral daily  furosemide    Tablet 40 milliGRAM(s) Oral daily  glucagon  Injectable 1 milliGRAM(s) IntraMuscular once  insulin glargine Injectable (LANTUS) 40 Unit(s) SubCutaneous at bedtime  insulin lispro (ADMELOG) corrective regimen sliding scale   SubCutaneous three times a day before meals  insulin lispro (ADMELOG) corrective regimen sliding scale   SubCutaneous at bedtime  lisinopril 10 milliGRAM(s) Oral daily  metoprolol succinate  milliGRAM(s) Oral daily    MEDICATIONS  (PRN):  dextrose Oral Gel 15 Gram(s) Oral once PRN Blood Glucose LESS THAN 70 milliGRAM(s)/deciliter      FAMILY HISTORY:  FH: CAD (coronary artery disease) (Mother)    Family history of diabetes mellitus (DM) (Mother, Sibling)    Allergies:    No Known Allergies      REVIEW OF SYSTEMS:  CONSTITUTIONAL: No fever, weight loss, chills, shakes, or fatigue  EYES: No eye pain, visual disturbances, or discharge  ENMT:  No difficulty hearing, tinnitus, vertigo; No sinus or throat pain  NECK: No pain or stiffness  BREASTS: No pain, masses, or nipple discharge  RESPIRATORY: No cough, wheezing, hemoptysis, or shortness of breath  CARDIOVASCULAR: No chest pain, dyspnea, palpitations, dizziness, syncope, paroxysmal nocturnal dyspnea, orthopnea, or arm or leg swelling  GASTROINTESTINAL: No abdominal  or epigastric pain, nausea, vomiting, hematemesis, diarrhea, constipation, melena or bright red blood.  GENITOURINARY: No dysuria, nocturia, hematuria, or urinary incontinence  NEUROLOGICAL: No headaches, memory loss, slurred speech, limb weakness, loss of strength, numbness, or tremors  SKIN: No itching, burning, rashes, or lesions   LYMPH NODES: No enlarged glands  ENDOCRINE: No heat or cold intolerance, or hair loss  MUSCULOSKELETAL: No joint pain or swelling, muscle, back, or extremity pain  PSYCHIATRIC: No depression, anxiety, or difficulty sleeping  HEME/LYMPH: No easy bruising or bleeding gums  ALLERY AND IMMUNOLOGIC: No hives or rash.      Vital Signs Last 24 Hrs  T(C): 36.8 (05 Apr 2024 07:17), Max: 36.8 (04 Apr 2024 23:42)  T(F): 98.3 (05 Apr 2024 07:17), Max: 98.3 (05 Apr 2024 07:17)  HR: 98 (05 Apr 2024 07:17) (86 - 100)  BP: 143/92 (05 Apr 2024 07:17) (94/53 - 143/92)  BP(mean): 113 (05 Apr 2024 07:17) (68 - 113)  RR: 20 (05 Apr 2024 07:17) (18 - 23)  SpO2: 95% (05 Apr 2024 07:17) (92% - 97%)    Parameters below as of 05 Apr 2024 07:17  Patient On (Oxygen Delivery Method): room air        PHYSICAL EXAM:  GENERAL: In no apparent distress, well nourished, and hydrated.  HEAD:  Atraumatic, Normocephalic  EYES: EOMI, PERRLA, conjunctiva and sclera clear  ENMT: No tonsillar erythema, exudates, or enlargements; ist mucous membranes, Good dentition, No lesions  NECK: Supple and normal thyroid.  No JVD or carotid bruit.  Carotid pulse is 2+ bilaterally.  HEART: Regular rate and rhythm; No murmurs, rubs, or gallops.  PULMONARY: Clear to auscultation and perfusion.  No rales, wheezing, or rhonchi bilaterally.  ABDOMEN: Soft, Nontender, Nondistended; Bowel sounds present  EXTREMITIES:  2+ Peripheral Pulses, No clubbing, cyanosis, or edema  LYMPH: No lymphadenopathy noted  NEUROLOGICAL: Grossly nonfocal    INTERPRETATION OF TELEMETRY: sinus rhythm     ECG:   Ventricular Rate 75 BPM    Atrial Rate 300 BPM    QRS Duration 114 ms    Q-T Interval 410 ms    QTC Calculation(Bazett) 457 ms    R Axis 44 degrees    T Axis 58 degrees    Diagnosis Line Atrial fibrillation  Incomplete right bundle branch block  Possible Anterior infarct , age undetermined  Abnormal ECG    Confirmed by RUDDY CHACON MD ([id number]) on 3/22/2024 7:51:27 AM      I&O's Detail    04 Apr 2024 07:01  -  05 Apr 2024 07:00  --------------------------------------------------------  IN:    Oral Fluid: 240 mL  Total IN: 240 mL    OUT:    Voided (mL): 1300 mL  Total OUT: 1300 mL    Total NET: -1060 mL      05 Apr 2024 07:01  -  05 Apr 2024 11:47  --------------------------------------------------------  IN:    Oral Fluid: 230 mL  Total IN: 230 mL    OUT:  Total OUT: 0 mL    Total NET: 230 mL          LABS:                  BNP  I&O's Detail    04 Apr 2024 07:01  -  05 Apr 2024 07:00  --------------------------------------------------------  IN:    Oral Fluid: 240 mL  Total IN: 240 mL    OUT:    Voided (mL): 1300 mL  Total OUT: 1300 mL    Total NET: -1060 mL      05 Apr 2024 07:01  -  05 Apr 2024 11:47  --------------------------------------------------------  IN:    Oral Fluid: 230 mL  Total IN: 230 mL    OUT:  Total OUT: 0 mL    Total NET: 230 mL        Daily Height in cm: 187.96 (04 Apr 2024 16:23)    Daily     RADIOLOGY & ADDITIONAL STUDIES:

## 2024-04-05 NOTE — DISCHARGE NOTE NURSING/CASE MANAGEMENT/SOCIAL WORK - PATIENT PORTAL LINK FT
You can access the FollowMyHealth Patient Portal offered by [facility] by registering at the following website: [email or web address]. By joining [facility]’s FollowMyHealth portal, you will also be able to view your health information using other applications (apps) compatible with our system.

## 2024-04-05 NOTE — PROGRESS NOTE ADULT - ASSESSMENT
58 year old male with the past medical history of hypertension, hyperlipidemia, diabetes mellitus atrial fibrillation is now status post successful radiofrequency (RF) ablation of Atrial Fibrillation in the form of: Pulmonary Veins Isolation, Successful Posterior Wall Isolation. Successful CTI isthmus ablation. No pericardial effusion on ICE    Plan:  - EKG noted  - Cont Eliquis uninterupted for 3 months postop  - Continue medications as tolerated  - Ambulate as tolerated  - Follow up in the office as scheduled    Dispo: Home today    Discharge Instructions:  - Continue Eliquis  - Do NOT stop blood thinners unless discussed with doctor  - Continue and discharge on Pepcid 20 mg daily  - No heavy lifting > 10 lbs, squatting, or exertional activities 1-2 weeks  - Can take a shower starting today  - No submerging in water for 1 week  - No driving for 3 days 58 year old male with the past medical history of hypertension, hyperlipidemia, diabetes mellitus atrial fibrillation is now status post successful radiofrequency (RF) ablation of Atrial Fibrillation in the form of: Pulmonary Veins Isolation, Successful Posterior Wall Isolation. Successful CTI isthmus ablation. No pericardial effusion on ICE    Plan:  - EKG noted  - Cont Eliquis lifelong, uninterupted for 3 months postop  - Continue medications as tolerated  - Ambulate as tolerated  - Follow up in the office as scheduled    Dispo: Home today    Discharge Instructions:  - Continue Eliquis  - Do NOT stop blood thinners unless discussed with doctor  - Continue and discharge on Pepcid 20 mg daily  - No heavy lifting > 10 lbs, squatting, or exertional activities 1-2 weeks  - Can take a shower starting today  - No submerging in water for 1 week  - No driving for 3 days

## 2024-04-10 NOTE — PRE-ANESTHESIA EVALUATION ADULT - ANESTHESIA, PREVIOUS REACTION, PROFILE
Patient states a need for prior authorization per the pharmacy on:    Medication: Wegovy 0.25MG/0.5ML  Dosage: Inject 0.25 mg into the skin one day a week  Quantity requested:  2 ml  Pharmacy for prescription has been selected.    Initiation of prior authorization needed.   none

## 2024-04-26 ENCOUNTER — EMERGENCY (EMERGENCY)
Facility: HOSPITAL | Age: 59
LOS: 0 days | Discharge: HOME | End: 2024-04-26
Attending: EMERGENCY MEDICINE
Payer: MEDICARE

## 2024-04-26 VITALS
OXYGEN SATURATION: 99 % | TEMPERATURE: 98 F | DIASTOLIC BLOOD PRESSURE: 85 MMHG | SYSTOLIC BLOOD PRESSURE: 130 MMHG | HEART RATE: 86 BPM | RESPIRATION RATE: 18 BRPM

## 2024-04-26 VITALS
HEIGHT: 74 IN | HEART RATE: 95 BPM | DIASTOLIC BLOOD PRESSURE: 84 MMHG | RESPIRATION RATE: 18 BRPM | OXYGEN SATURATION: 98 % | TEMPERATURE: 98 F | SYSTOLIC BLOOD PRESSURE: 134 MMHG

## 2024-04-26 DIAGNOSIS — R07.89 OTHER CHEST PAIN: ICD-10-CM

## 2024-04-26 DIAGNOSIS — E11.9 TYPE 2 DIABETES MELLITUS WITHOUT COMPLICATIONS: ICD-10-CM

## 2024-04-26 DIAGNOSIS — E78.5 HYPERLIPIDEMIA, UNSPECIFIED: ICD-10-CM

## 2024-04-26 DIAGNOSIS — I11.0 HYPERTENSIVE HEART DISEASE WITH HEART FAILURE: ICD-10-CM

## 2024-04-26 DIAGNOSIS — Q67.6 PECTUS EXCAVATUM: Chronic | ICD-10-CM

## 2024-04-26 DIAGNOSIS — I42.9 CARDIOMYOPATHY, UNSPECIFIED: ICD-10-CM

## 2024-04-26 DIAGNOSIS — Z92.89 PERSONAL HISTORY OF OTHER MEDICAL TREATMENT: Chronic | ICD-10-CM

## 2024-04-26 DIAGNOSIS — R60.9 EDEMA, UNSPECIFIED: ICD-10-CM

## 2024-04-26 DIAGNOSIS — R00.2 PALPITATIONS: ICD-10-CM

## 2024-04-26 DIAGNOSIS — I50.9 HEART FAILURE, UNSPECIFIED: ICD-10-CM

## 2024-04-26 DIAGNOSIS — I45.10 UNSPECIFIED RIGHT BUNDLE-BRANCH BLOCK: ICD-10-CM

## 2024-04-26 DIAGNOSIS — I48.91 UNSPECIFIED ATRIAL FIBRILLATION: ICD-10-CM

## 2024-04-26 DIAGNOSIS — K21.9 GASTRO-ESOPHAGEAL REFLUX DISEASE WITHOUT ESOPHAGITIS: ICD-10-CM

## 2024-04-26 DIAGNOSIS — Z79.01 LONG TERM (CURRENT) USE OF ANTICOAGULANTS: ICD-10-CM

## 2024-04-26 LAB
ALBUMIN SERPL ELPH-MCNC: 4.4 G/DL — SIGNIFICANT CHANGE UP (ref 3.5–5.2)
ALP SERPL-CCNC: 119 U/L — HIGH (ref 30–115)
ALT FLD-CCNC: 33 U/L — SIGNIFICANT CHANGE UP (ref 0–41)
ANION GAP SERPL CALC-SCNC: 11 MMOL/L — SIGNIFICANT CHANGE UP (ref 7–14)
AST SERPL-CCNC: 22 U/L — SIGNIFICANT CHANGE UP (ref 0–41)
BASOPHILS # BLD AUTO: 0.04 K/UL — SIGNIFICANT CHANGE UP (ref 0–0.2)
BASOPHILS NFR BLD AUTO: 0.6 % — SIGNIFICANT CHANGE UP (ref 0–1)
BILIRUB SERPL-MCNC: 1 MG/DL — SIGNIFICANT CHANGE UP (ref 0.2–1.2)
BUN SERPL-MCNC: 14 MG/DL — SIGNIFICANT CHANGE UP (ref 10–20)
CALCIUM SERPL-MCNC: 9.4 MG/DL — SIGNIFICANT CHANGE UP (ref 8.4–10.5)
CHLORIDE SERPL-SCNC: 103 MMOL/L — SIGNIFICANT CHANGE UP (ref 98–110)
CO2 SERPL-SCNC: 26 MMOL/L — SIGNIFICANT CHANGE UP (ref 17–32)
CREAT SERPL-MCNC: 0.7 MG/DL — SIGNIFICANT CHANGE UP (ref 0.7–1.5)
EGFR: 107 ML/MIN/1.73M2 — SIGNIFICANT CHANGE UP
EOSINOPHIL # BLD AUTO: 0.18 K/UL — SIGNIFICANT CHANGE UP (ref 0–0.7)
EOSINOPHIL NFR BLD AUTO: 2.5 % — SIGNIFICANT CHANGE UP (ref 0–8)
GLUCOSE SERPL-MCNC: 185 MG/DL — HIGH (ref 70–99)
HCT VFR BLD CALC: 46.7 % — SIGNIFICANT CHANGE UP (ref 42–52)
HGB BLD-MCNC: 16 G/DL — SIGNIFICANT CHANGE UP (ref 14–18)
IMM GRANULOCYTES NFR BLD AUTO: 0.3 % — SIGNIFICANT CHANGE UP (ref 0.1–0.3)
LYMPHOCYTES # BLD AUTO: 2.51 K/UL — SIGNIFICANT CHANGE UP (ref 1.2–3.4)
LYMPHOCYTES # BLD AUTO: 34.8 % — SIGNIFICANT CHANGE UP (ref 20.5–51.1)
MAGNESIUM SERPL-MCNC: 1.9 MG/DL — SIGNIFICANT CHANGE UP (ref 1.8–2.4)
MCHC RBC-ENTMCNC: 28.5 PG — SIGNIFICANT CHANGE UP (ref 27–31)
MCHC RBC-ENTMCNC: 34.3 G/DL — SIGNIFICANT CHANGE UP (ref 32–37)
MCV RBC AUTO: 83.2 FL — SIGNIFICANT CHANGE UP (ref 80–94)
MONOCYTES # BLD AUTO: 0.48 K/UL — SIGNIFICANT CHANGE UP (ref 0.1–0.6)
MONOCYTES NFR BLD AUTO: 6.6 % — SIGNIFICANT CHANGE UP (ref 1.7–9.3)
NEUTROPHILS # BLD AUTO: 3.99 K/UL — SIGNIFICANT CHANGE UP (ref 1.4–6.5)
NEUTROPHILS NFR BLD AUTO: 55.2 % — SIGNIFICANT CHANGE UP (ref 42.2–75.2)
NRBC # BLD: 0 /100 WBCS — SIGNIFICANT CHANGE UP (ref 0–0)
NT-PROBNP SERPL-SCNC: 1103 PG/ML — HIGH (ref 0–300)
PLATELET # BLD AUTO: 238 K/UL — SIGNIFICANT CHANGE UP (ref 130–400)
PMV BLD: 12 FL — HIGH (ref 7.4–10.4)
POTASSIUM SERPL-MCNC: 4.2 MMOL/L — SIGNIFICANT CHANGE UP (ref 3.5–5)
POTASSIUM SERPL-SCNC: 4.2 MMOL/L — SIGNIFICANT CHANGE UP (ref 3.5–5)
PROT SERPL-MCNC: 7.5 G/DL — SIGNIFICANT CHANGE UP (ref 6–8)
RBC # BLD: 5.61 M/UL — SIGNIFICANT CHANGE UP (ref 4.7–6.1)
RBC # FLD: 13.5 % — SIGNIFICANT CHANGE UP (ref 11.5–14.5)
SODIUM SERPL-SCNC: 140 MMOL/L — SIGNIFICANT CHANGE UP (ref 135–146)
TROPONIN T, HIGH SENSITIVITY RESULT: 8 NG/L — SIGNIFICANT CHANGE UP (ref 6–21)
WBC # BLD: 7.22 K/UL — SIGNIFICANT CHANGE UP (ref 4.8–10.8)
WBC # FLD AUTO: 7.22 K/UL — SIGNIFICANT CHANGE UP (ref 4.8–10.8)

## 2024-04-26 PROCEDURE — 80053 COMPREHEN METABOLIC PANEL: CPT

## 2024-04-26 PROCEDURE — 99283 EMERGENCY DEPT VISIT LOW MDM: CPT

## 2024-04-26 PROCEDURE — 71045 X-RAY EXAM CHEST 1 VIEW: CPT | Mod: 26

## 2024-04-26 PROCEDURE — 93005 ELECTROCARDIOGRAM TRACING: CPT

## 2024-04-26 PROCEDURE — 99285 EMERGENCY DEPT VISIT HI MDM: CPT | Mod: 25

## 2024-04-26 PROCEDURE — 99285 EMERGENCY DEPT VISIT HI MDM: CPT

## 2024-04-26 PROCEDURE — 83735 ASSAY OF MAGNESIUM: CPT

## 2024-04-26 PROCEDURE — 71045 X-RAY EXAM CHEST 1 VIEW: CPT

## 2024-04-26 PROCEDURE — 83880 ASSAY OF NATRIURETIC PEPTIDE: CPT

## 2024-04-26 PROCEDURE — 85025 COMPLETE CBC W/AUTO DIFF WBC: CPT

## 2024-04-26 PROCEDURE — 84484 ASSAY OF TROPONIN QUANT: CPT

## 2024-04-26 PROCEDURE — 36415 COLL VENOUS BLD VENIPUNCTURE: CPT

## 2024-04-26 PROCEDURE — 93010 ELECTROCARDIOGRAM REPORT: CPT

## 2024-04-26 RX ORDER — DRONEDARONE 400 MG/1
1 TABLET, FILM COATED ORAL
Qty: 60 | Refills: 0
Start: 2024-04-26 | End: 2024-05-25

## 2024-04-26 RX ORDER — DRONEDARONE 400 MG/1
400 TABLET, FILM COATED ORAL ONCE
Refills: 0 | Status: COMPLETED | OUTPATIENT
Start: 2024-04-26 | End: 2024-04-26

## 2024-04-26 RX ADMIN — DRONEDARONE 400 MILLIGRAM(S): 400 TABLET, FILM COATED ORAL at 13:58

## 2024-04-26 NOTE — ED ADULT NURSE NOTE - CAS TRG GENERAL NORM CIRC DET
Patient comes to clinic for follow up anticoagulation visit.  Last INR on 5/10 was 2.0.  Dose maintained at reduced dose secondary to abx use.   Today's INR is 1.6 and is below goal range.    Current warfarin total weekly dose of 10.5 mg verified secondary to reduced dosing while on abx.  Informed the INR result is below therapeutic range and instructed to resume previous therapeutic dosing prior to abx use. Discussed dose and return date of 5/19 for next INR. See Anticoagulation flowsheet.    Dr. Gardner is in the office today supervising the treatment.Routed to MD for review.    Call your physician immediately if you notice any of the following symptoms of a blood clot:   · Sudden weakness in any limb  · Numbness or tingling anywhere  · Visual changes or loss of sight in either eye  · Sudden onset of slurred speech or inability to speak  · Dizziness or faintness  · New pain, swelling, redness or heat in any extremity  · New SOB or chest pain  Symptoms associated with blood clotting/low INR reviewed and verbalizes understanding.    Instructed to contact the clinic with any unusual bleeding or bruising, any changes in medications, diet, health status, lifestyle, or any other changes, questions or concerns. Verbalized understanding of all discussed.     
Strong peripheral pulses

## 2024-04-26 NOTE — CONSULT NOTE ADULT - SUBJECTIVE AND OBJECTIVE BOX
Outpt cardiologist: Dr. Piper     HPI:  59-year-old male with past medical history of hypertension, hyperlipidemia, diabetes, CHF, A-fib on Eliquis, GERD presenting with palpitations.  Patient underwent ablation for atrial fibrillation on April 4 by Dr. Mendoza which patient says was successful, however, over the past 3 days began experiencing palpitations again.  Endorses chest discomfort, but denies pain.  Denies fever, chills, cough, congestion.  Denies nausea, vomiting, diarrhea, urinary symptoms.  Patient says he has been compliant with metoprolol succinate 100 mg twice daily, Eliquis.      PAST MEDICAL & SURGICAL HISTORY  Afib    HTN (hypertension)    HLD (hyperlipidemia)    Type 2 diabetes mellitus    Pectus excavatum    History of cardioversion        FAMILY HISTORY:  FAMILY HISTORY:  FH: CAD (coronary artery disease) (Mother)    Family history of diabetes mellitus (DM) (Mother, Sibling)        SOCIAL HISTORY:  Social History: negx3      ALLERGIES:  No Known Allergies      MEDICATIONS:  dronedarone 400 milliGRAM(s) Oral once    PRN:      HOME MEDICATIONS:  Home Medications:  atorvastatin 10 mg oral tablet: 1 tab(s) orally once a day (at bedtime) (05 Apr 2024 11:50)  Eliquis 5 mg oral tablet: 1 tab(s) orally 2 times a day (04 Apr 2024 07:25)  furosemide 40 mg oral tablet: 1 tab(s) orally once a day (05 Apr 2024 11:50)  lisinopril 10 mg oral tablet: 1 tab(s) orally once a day (05 Apr 2024 13:06)  metoprolol succinate 100 mg oral tablet, extended release: 1 tab(s) orally 2 times a day (04 Apr 2024 07:25)  Tresiba 100 units/mL subcutaneous solution: 40 subcutaneous once a day (at bedtime) (04 Apr 2024 07:25)      VITALS:   T(F): 98 (04-26 @ 10:40), Max: 98 (04-26 @ 10:40)  HR: 95 (04-26 @ 10:40) (95 - 95)  BP: 134/84 (04-26 @ 10:40) (134/84 - 134/84)  BP(mean): --  RR: 18 (04-26 @ 10:40) (18 - 18)  SpO2: 98% (04-26 @ 10:40) (98% - 98%)    I&O's Summary      REVIEW OF SYSTEMS:  CONSTITUTIONAL: No weakness, fevers or chills  HEENT: No visual changes, neck/ear pain  RESPIRATORY: No cough, sob  CARDIOVASCULAR: See HPI  GASTROINTESTINAL: No abdominal pain. No nausea, vomiting, diarrhea   GENITOURINARY: No dysuria, frequency or hematuria  NEUROLOGICAL: No new focal deficits  SKIN: No new rashes    PHYSICAL EXAM:  General: Not in distress.  Non-toxic appearing.   HEENT: EOMI  Cardio: irregular, S1, S2, no murmur  Pulm: B/L BS.  No wheezing / crackles / rales  Abdomen: Soft, non-tender, non-distended. Normoactive bowel sounds  Extremities: No edema b/l le  Neuro: A&O x3. No focal deficits

## 2024-04-26 NOTE — ED PROVIDER NOTE - EKG/XRAY ADDITIONAL INFORMATION
Independent interpretation of the xray(s) performed by Dr. Lucy Godoy:  No infiltrate, effusion or acute abnormality.

## 2024-04-26 NOTE — ED PROVIDER NOTE - ATTENDING CONTRIBUTION TO CARE
47-year-old male past medical history significant for hypertension, dyslipidemia, diabetes, cardiomyopathy (EF–45-50%), status post pectus excavatum repair, A-fib on Eliquis, status post ablation on 4/4 presents now with 3 days of palpitations and "heart racing."  Patient reports he believes he is back in atrial fibrillation.  Positive associated mild left-sided chest pain.  No shortness of breath.  No dizziness or near syncope.  No recent illness, fever, chills, URI symptoms or cough.  Chronic trace bilateral leg edema which is unchanged recently.  Patient is compliant with medications.  CONSTITUTIONAL: Well-appearing; well-nourished; in no apparent distress.   HEAD: Normocephalic; atraumatic.   EYES: PERRL; EOM intact. Conjunctiva normal B/L.   ENT: Normal pharynx with no tonsillar hypertrophy. MMM.  NECK: Supple; non-tender; no cervical lymphadenopathy.   CHEST: Normal chest excursion with respiration.   CARDIOVASCULAR: Irreg irreg rhythm.   RESPIRATORY: Normal chest excursion with respiration; breath sounds clear and equal bilaterally; no wheezes, rhonchi, or rales.  GI/: Normal bowel sounds; non-distended; non-tender.  BACK: No evidence of trauma or deformity. Non-tender to palpation. No CVA tenderness.

## 2024-04-26 NOTE — ED PROVIDER NOTE - PATIENT PORTAL LINK FT
You can access the FollowMyHealth Patient Portal offered by [facility] by registering at the following website: [email or web address]. By joining [facility]’s FollowMyHealth portal, you will also be able to view your health information using other applications (apps) compatible with our system.

## 2024-04-26 NOTE — ED ADULT TRIAGE NOTE - NS ED NURSE BANDS TYPE
Hypertensive,  MD notified.  Increased Losartan.  OVSS. Per mom, good appetite and fluid intake.  No n/v.  Denies pain.  No respiratory issues.     Name band;

## 2024-04-26 NOTE — CONSULT NOTE ADULT - ASSESSMENT
59-year-old male with past medical history of hypertension, hyperlipidemia, diabetes, A-fib (on Eliquis), GERD presenting with palpitations.  Patient underwent ablation for atrial fibrillation on April 4 by Dr. Mendoza which patient says was successful, however, over the past 3 days began experiencing palpitations again. Endorses chest discomfort, but denies pain.  Denies fever, chills, cough, congestion.  Denies nausea, vomiting, diarrhea, urinary symptoms.     # Par Atrial Fibrillation  # HTN / DLD / DM  - s/p Afib Ablation (04/04/24)  - patient says he has been compliant with metoprolol succinate 100 mg twice daily, Eliquis  - in Afib, rate controlled at encounter  - EKG: Afib with RBBB  - pt refusing HUSSAIN DCCV at this time, he wants to take meds and monitor   - start Multaq 400 mg q12 (first dose now)  - c/w metoprolol succinate 100 mg twice daily  - c/w Eliquis  - outpt f/u with Dr. Mendoza with in 1-2 weeks   - if pt remains in Afib then will do outpt HUSSAIN DCCV    59-year-old male with past medical history of hypertension, hyperlipidemia, diabetes, A-fib (on Eliquis), GERD presenting with palpitations.  Patient underwent ablation for atrial fibrillation on April 4 by Dr. Mendoza which patient says was successful, however, over the past 3 days began experiencing palpitations again. Endorses chest discomfort, but denies pain.  Denies fever, chills, cough, congestion.  Denies nausea, vomiting, diarrhea, urinary symptoms.     # Par Atrial Fibrillation  # HTN / DLD / DM  - s/p Afib Ablation (04/04/24)  - patient says he has been compliant with metoprolol succinate 100 mg twice daily, Eliquis  - in Afib, rate controlled at encounter  - EKG: Afib with RBBB  - pt refusing HUSSAIN DCCV at this time, he wants to take meds and monitor   - start Multaq 400 mg q12 (first dose now)  - c/w metoprolol succinate 100 mg twice daily  - c/w Eliquis  - pt has appointment with Dr. Mendoza on 05/06/24  - cleared from EP for discharge

## 2024-04-26 NOTE — ED PROVIDER NOTE - OBJECTIVE STATEMENT
Patient is a 59-year-old male with past medical history of hypertension, hyperlipidemia, diabetes, CHF, A-fib on Eliquis, GERD presenting with palpitations.  Patient underwent ablation for atrial fibrillation on April 4 by Dr. Mendoza which patient says was successful, however, over the past 3 days began experiencing palpitations again.  Endorses chest discomfort, but denies pain.  Denies fever, chills, cough, congestion.  Denies nausea, vomiting, diarrhea, urinary symptoms.  Patient says he has been compliant with metoprolol succinate 100 mg twice daily, Eliquis.  Patient otherwise well

## 2024-04-26 NOTE — ED PROVIDER NOTE - CLINICAL SUMMARY MEDICAL DECISION MAKING FREE TEXT BOX
47-year-old male past medical history as documented status post A-fib ablation on 4/4 presents with 3 days of palpitations.  Patient reports compliance with medication.  EKG with A-fib.  All labs reviewed.  Chest x-ray with no acute pathology.  EP consulted.  EP recommendations appreciated.  Patient discharged on Multaq and will follow-up with EP as an outpatient.  Patient given return instructions.

## 2024-04-26 NOTE — ED PROVIDER NOTE - PHYSICAL EXAMINATION
CONSTITUTIONAL: Well-developed; well-nourished; NAD  SKIN: warm, dry, w/o rash  HEAD: NCAT  EYES: PERRLA, EOMI, no conjunctival injection  ENT: No nasal discharge; nl OP without erythema or exudates  NECK: Supple, non-tender  CARD: nl S1, S2; IRR, no MRG, no JVD  RESP: CTAB, normal respiratory effort  ABD: BS+, soft, NTND, no HSM  EXT: Normal ROM.  No clubbing, cyanosis or edema  NEURO: Alert, oriented, grossly unremarkable  PSYCH: Cooperative, appropriate

## 2024-04-26 NOTE — ED ADULT NURSE NOTE - HIV OFFER
DVT ppx: Was on lovenox - currently being held for OR friday (received dose today)    Severe protein calorie malnutrition-f/u calorie count and any additional nutrition/dietician recs . Pt will likely need alternative measure for nutrition.     Dispo planning: complex dispo planning due to lack of insurance Stable. Anemia to 11.4, microcytic but barely at 79.8. May be combination of Iron deficiency anemia from poor PO intake and normocytic anemia from Anemia of Chronic Disease w/ this presentation c/f gastric malignancy.  - Total Iron 22, TIBC 165, transferrin 149    8/25 - Hb 9.6, H/H stable    continue to monitor with CBCs Opt out

## 2024-05-06 ENCOUNTER — APPOINTMENT (OUTPATIENT)
Dept: ELECTROPHYSIOLOGY | Facility: CLINIC | Age: 59
End: 2024-05-06
Payer: MEDICARE

## 2024-05-06 VITALS
HEIGHT: 74 IN | BODY MASS INDEX: 28.23 KG/M2 | DIASTOLIC BLOOD PRESSURE: 70 MMHG | HEART RATE: 91 BPM | SYSTOLIC BLOOD PRESSURE: 130 MMHG | TEMPERATURE: 97.1 F | WEIGHT: 220 LBS

## 2024-05-06 DIAGNOSIS — I48.19 OTHER PERSISTENT ATRIAL FIBRILLATION: ICD-10-CM

## 2024-05-06 DIAGNOSIS — I50.9 HEART FAILURE, UNSPECIFIED: ICD-10-CM

## 2024-05-06 DIAGNOSIS — I34.0 NONRHEUMATIC MITRAL (VALVE) INSUFFICIENCY: ICD-10-CM

## 2024-05-06 DIAGNOSIS — E78.5 HYPERLIPIDEMIA, UNSPECIFIED: ICD-10-CM

## 2024-05-06 DIAGNOSIS — G47.33 OBSTRUCTIVE SLEEP APNEA (ADULT) (PEDIATRIC): ICD-10-CM

## 2024-05-06 PROCEDURE — 99214 OFFICE O/P EST MOD 30 MIN: CPT | Mod: 25

## 2024-05-06 PROCEDURE — 93000 ELECTROCARDIOGRAM COMPLETE: CPT

## 2024-05-06 RX ORDER — METOPROLOL SUCCINATE 100 MG/1
100 TABLET, EXTENDED RELEASE ORAL
Qty: 60 | Refills: 1 | Status: ACTIVE | COMMUNITY

## 2024-05-06 RX ORDER — EMPAGLIFLOZIN 25 MG/1
25 TABLET, FILM COATED ORAL
Qty: 90 | Refills: 3 | Status: COMPLETED | COMMUNITY
End: 2024-05-06

## 2024-05-06 RX ORDER — REPAGLINIDE 0.5 MG/1
0.5 TABLET ORAL 3 TIMES DAILY
Refills: 0 | Status: ACTIVE | COMMUNITY

## 2024-05-06 RX ORDER — ATORVASTATIN CALCIUM 10 MG/1
10 TABLET, FILM COATED ORAL
Qty: 90 | Refills: 3 | Status: ACTIVE | COMMUNITY

## 2024-05-06 RX ORDER — FAMOTIDINE 20 MG/1
20 TABLET, FILM COATED ORAL DAILY
Refills: 0 | Status: ACTIVE | COMMUNITY

## 2024-05-06 RX ORDER — AMLODIPINE BESYLATE 5 MG/1
5 TABLET ORAL
Qty: 90 | Refills: 3 | Status: COMPLETED | COMMUNITY
Start: 2019-10-04 | End: 2024-05-06

## 2024-05-06 RX ORDER — APIXABAN 5 MG/1
5 TABLET, FILM COATED ORAL
Qty: 180 | Refills: 1 | Status: ACTIVE | COMMUNITY
Start: 2024-02-12

## 2024-05-06 RX ORDER — INSULIN DEGLUDEC INJECTION 200 U/ML
200 INJECTION, SOLUTION SUBCUTANEOUS DAILY
Refills: 0 | Status: ACTIVE | COMMUNITY
Start: 2020-09-10

## 2024-05-06 RX ORDER — DRONEDARONE 400 MG/1
400 TABLET, FILM COATED ORAL DAILY
Refills: 0 | Status: ACTIVE | COMMUNITY

## 2024-05-06 RX ORDER — LISINOPRIL 5 MG/1
5 TABLET ORAL TWICE DAILY
Refills: 0 | Status: ACTIVE | COMMUNITY

## 2024-05-06 RX ORDER — LISINOPRIL 10 MG/1
10 TABLET ORAL
Qty: 180 | Refills: 0 | Status: COMPLETED | COMMUNITY
End: 2024-05-06

## 2024-05-06 RX ORDER — FUROSEMIDE 40 MG/1
40 TABLET ORAL
Qty: 7 | Refills: 0 | Status: ACTIVE | COMMUNITY

## 2024-05-06 RX ORDER — SEMAGLUTIDE 1.34 MG/ML
2 INJECTION, SOLUTION SUBCUTANEOUS
Qty: 3 | Refills: 0 | Status: COMPLETED | COMMUNITY
Start: 2020-11-23 | End: 2024-05-06

## 2024-05-06 NOTE — HISTORY OF PRESENT ILLNESS
[FreeTextEntry1] : Hypertension, hyperlipidemia, diabetes mellitus, cardiomyopathy with EF 45-50%, moderate mitral regurgitation, s/p pectus excavatum surgical repair in 1979, and persistent atrial fibrillation.    Patient was diagnosed with persistent atrial fibrillation in September 2023. He underwent a successful HUSSAIN and cardioversion with Dr. Zavala. He had a recurrence of atrial fibrillation after cardioversion. He was treated transiently with Amiodarone, but Amiodarone was stopped subsequently.     Patient was admitted to [facility] in November 2023 and was diagnosed with pneumonia. He was treated with antibiotics and atrial fibrillation was rate controlled.    Patient remained in atrial fibrillation with rate fairly controlled. He is on Eliquis for stroke prevention.    (4/4/2024): RF ablation of AF (PVI + PWI + CTI) (4/26/2024) early recurrence of AF. started on Multaq. (5/6/2024) has sensation of irregular heartbeat. Patient has no chest pain, no shortness of breath at rest, mild dyspnea on exertion, no dizziness, no lightheadedness, and no syncope. He reports increased fatigue. He presents for evaluation.

## 2024-05-06 NOTE — CARDIOLOGY SUMMARY
[de-identified] : (05/6/2024): ECG. Atrial fibrillation at 91 bpm, nonspecific T wave abnormalities.   (02/12/2024): ECG. Atrial fibrillation at 100 bpm, nonspecific T wave abnormalities.   [de-identified] : (12/28/2023): 2D echo at Dr. Long' office. EF 45-50%. Moderate MR, eccentric. Could be underestimated.    (11/30/2023): 2D echo at [facility]. Technically limited study. LVEF grossly preserved. Some septal dyssynchrony. Moderate LA dilatation. Mild RV dilatation. Mild RV systolic dysfunction. Mild MR. Mild TR. No AI.    [de-identified] : (4/4/2024): RF ablation of AF (PVI + PWI + CTI) (09/2023): HUSSAIN and DC cardioversion with Dr. Zavala.

## 2024-05-06 NOTE — DISCUSSION/SUMMARY
[FreeTextEntry1] : Mr. Arie Apple is a pleasant 59-year-old man with hypertension, hyperlipidemia, cardiomyopathy with EF of 45-50%, moderate mitral regurgitation, s/p pectus excavatum surgical repair in 1979, and persistent atrial fibrillation. Patient was diagnosed with persistent atrial fibrillation in September 2023 and underwent HUSSAIN and cardioversion. He had a recurrence of atrial fibrillation soon after cardioversion. His CHADSVASC score is 3 (HTN, DM, CM). He is on Eliquis for stroke prevention. On (4/4/2024): RF ablation of AF (PVI + PWI + CTI). On (4/26/2024) early recurrence of AF. started on Multaq. (5/6/2024) has sensation of irregular heartbeat.   I recommend continuing Eliquis for stroke prevention.    I recommend continuing the same medication for the treatment of hypertension.    I recommend continuing Metoprolol + Multaq for rate/rhythm control.    Patient is interested in restoring sinus rhythm through direct current cardioversion. I explained to patient the need of a HUSSAIN prior to cardioversion to rule out thrombus. I discussed in details the nature of the procedure, alternatives, benefits, and risks of the cardioversion including risk of aspiration, injury to the esophagus, perforation, skin burn, stroke, and cardiac arrest. Patient expressed understanding of the discussion and all questions were answered in details. Patient expressed agreement to proceed with cardioversion. My  will contact patient with date and instructions for the procedure.  Procedure will be scheduled by Dr. Calderon.  I discussed with patient plan of care in great details. I answered all his questions to his satisfaction. Patient was pleased with the visit.  Patient will follow with me in 4 months time. Please do not hesitate to contact me at [phone] if you have any further questions regarding this patient care.   [EKG obtained to assist in diagnosis and management of assessed problem(s)] : EKG obtained to assist in diagnosis and management of assessed problem(s)

## 2024-05-08 RX ORDER — FUROSEMIDE 40 MG
1 TABLET ORAL
Qty: 0 | Refills: 0 | DISCHARGE

## 2024-05-08 RX ORDER — ATORVASTATIN CALCIUM 80 MG/1
1 TABLET, FILM COATED ORAL
Qty: 0 | Refills: 0 | DISCHARGE

## 2024-05-08 RX ORDER — INSULIN DEGLUDEC 100 U/ML
40 INJECTION, SOLUTION SUBCUTANEOUS
Refills: 0 | DISCHARGE

## 2024-05-08 RX ORDER — APIXABAN 2.5 MG/1
1 TABLET, FILM COATED ORAL
Qty: 0 | Refills: 0 | DISCHARGE

## 2024-09-09 ENCOUNTER — APPOINTMENT (OUTPATIENT)
Dept: ELECTROPHYSIOLOGY | Facility: CLINIC | Age: 59
End: 2024-09-09

## 2024-09-17 ENCOUNTER — NON-APPOINTMENT (OUTPATIENT)
Age: 59
End: 2024-09-17

## 2024-09-17 PROBLEM — R31.0 GROSS HEMATURIA: Status: ACTIVE | Noted: 2024-09-17

## 2024-09-18 ENCOUNTER — LABORATORY RESULT (OUTPATIENT)
Age: 59
End: 2024-09-18

## 2024-09-19 LAB — BACTERIA UR CULT: NORMAL

## 2024-09-25 ENCOUNTER — APPOINTMENT (OUTPATIENT)
Dept: UROLOGY | Facility: CLINIC | Age: 59
End: 2024-09-25
Payer: MEDICARE

## 2024-09-25 VITALS
OXYGEN SATURATION: 95 % | BODY MASS INDEX: 28.23 KG/M2 | HEART RATE: 78 BPM | SYSTOLIC BLOOD PRESSURE: 173 MMHG | WEIGHT: 220 LBS | RESPIRATION RATE: 17 BRPM | DIASTOLIC BLOOD PRESSURE: 94 MMHG | TEMPERATURE: 98 F | HEIGHT: 74 IN

## 2024-09-25 DIAGNOSIS — R31.0 GROSS HEMATURIA: ICD-10-CM

## 2024-09-25 DIAGNOSIS — N13.8 BENIGN PROSTATIC HYPERPLASIA WITH LOWER URINARY TRACT SYMPMS: ICD-10-CM

## 2024-09-25 DIAGNOSIS — R97.20 ELEVATED PROSTATE, SPECIFIC ANTIGEN [PSA]: ICD-10-CM

## 2024-09-25 DIAGNOSIS — N40.1 BENIGN PROSTATIC HYPERPLASIA WITH LOWER URINARY TRACT SYMPMS: ICD-10-CM

## 2024-09-25 LAB
BILIRUB UR QL STRIP: NORMAL
COLLECTION METHOD: NORMAL
GLUCOSE UR-MCNC: NORMAL
HCG UR QL: 0.2 EU/DL
HGB UR QL STRIP.AUTO: NORMAL
KETONES UR-MCNC: NORMAL
LEUKOCYTE ESTERASE UR QL STRIP: NORMAL
NITRITE UR QL STRIP: NORMAL
PH UR STRIP: 6
PROT UR STRIP-MCNC: NORMAL
SP GR UR STRIP: 1.02

## 2024-09-25 PROCEDURE — 99214 OFFICE O/P EST MOD 30 MIN: CPT | Mod: 25

## 2024-09-25 PROCEDURE — 81003 URINALYSIS AUTO W/O SCOPE: CPT | Mod: QW

## 2024-09-25 NOTE — END OF VISIT
[FreeTextEntry3] : I obtained history/physical, formulated treatment plans I discussed with the patient agree with the above transcription by the physicians assistant

## 2024-09-25 NOTE — ASSESSMENT
[FreeTextEntry1] : 59-year-old with 2 active urologic issues.  As for elevated PSA, patient understands that recommendation is to still proceed with a prostate biopsy.  He understands possibility of a life shortening prostate cancer.  Patient states that he is currently being taken off blood thinners as he had a cardiac ablation and will reconsider proceeding with prostate biopsy.  As for gross hematuria, he verbalized understanding concern for potential of genitourinary malignancy as a cause of hematuria.  Recommend BMP, CT urography, urine cytology, and follow-up for cystoscopy. Cystoscopy procedure reviewed with patient. Risks of allergic reaction to IV CT contrast reviewed. Patient verbalized understanding and is agreeable with plan.

## 2024-09-25 NOTE — HISTORY OF PRESENT ILLNESS
[FreeTextEntry1] : 59-year-old with history of elevated PSA.  Last seen October 2022 when his PSA was 25.7 ng/mL.  He has history of MRI prostate April 2022 showing 108 cc prostate.  History of prostate biopsies in the past high-grade PIN in 2020 and negative biopsy in 2021.  At his last appointment October 2022, repeat prostate biopsy was recommended patient adamantly refused verbalizing understanding of the potential of a deadly prostate cancer being present.  Patient was not compliant with finasteride 5 mg daily in past. His most recent PSA this year 17.1 ng/mL.  Presents to office with chief complaint of gross hematuria earlier this month.  UA and U culture negative.

## 2024-09-27 LAB
BACTERIA UR CULT: NORMAL
URINE CYTOLOGY: NORMAL

## 2024-10-03 ENCOUNTER — APPOINTMENT (OUTPATIENT)
Dept: ENDOCRINOLOGY | Facility: CLINIC | Age: 59
End: 2024-10-03

## 2024-10-03 VITALS
WEIGHT: 225 LBS | BODY MASS INDEX: 28.89 KG/M2 | DIASTOLIC BLOOD PRESSURE: 83 MMHG | SYSTOLIC BLOOD PRESSURE: 153 MMHG | HEART RATE: 75 BPM

## 2024-10-03 DIAGNOSIS — E11.9 TYPE 2 DIABETES MELLITUS W/OUT COMPLICATIONS: ICD-10-CM

## 2024-10-03 DIAGNOSIS — E66.3 OVERWEIGHT: ICD-10-CM

## 2024-10-03 DIAGNOSIS — Z78.9 OTHER SPECIFIED HEALTH STATUS: ICD-10-CM

## 2024-10-03 NOTE — HISTORY OF PRESENT ILLNESS
[FreeTextEntry1] : patient claims dr valdez told him to see a thyroid doctor, but dr valdez is an endocrinologist, he's intolerant to SGLT-2 inhibitor therapy due to polyuria, he;s getting frequent hypoglymia due to insulin and repaglinide.

## 2024-10-03 NOTE — ASSESSMENT
[FreeTextEntry1] : excessive therapy, stop repaglinide, reduce tresiba, continue ozempic, and metformin, patient will be followed by DR GARCÍA.

## 2024-10-16 ENCOUNTER — APPOINTMENT (OUTPATIENT)
Dept: UROLOGY | Facility: CLINIC | Age: 59
End: 2024-10-16

## 2024-10-21 ENCOUNTER — APPOINTMENT (OUTPATIENT)
Dept: OTOLARYNGOLOGY | Facility: CLINIC | Age: 59
End: 2024-10-21

## 2024-10-22 ENCOUNTER — APPOINTMENT (OUTPATIENT)
Dept: UROLOGY | Facility: CLINIC | Age: 59
End: 2024-10-22

## 2024-10-28 ENCOUNTER — APPOINTMENT (OUTPATIENT)
Dept: ELECTROPHYSIOLOGY | Facility: CLINIC | Age: 59
End: 2024-10-28

## 2024-12-02 ENCOUNTER — APPOINTMENT (OUTPATIENT)
Dept: ELECTROPHYSIOLOGY | Facility: CLINIC | Age: 59
End: 2024-12-02
Payer: MEDICARE

## 2024-12-02 VITALS
WEIGHT: 230 LBS | HEART RATE: 79 BPM | SYSTOLIC BLOOD PRESSURE: 172 MMHG | DIASTOLIC BLOOD PRESSURE: 102 MMHG | HEIGHT: 74 IN | BODY MASS INDEX: 29.52 KG/M2

## 2024-12-02 DIAGNOSIS — E78.5 HYPERLIPIDEMIA, UNSPECIFIED: ICD-10-CM

## 2024-12-02 DIAGNOSIS — G47.33 OBSTRUCTIVE SLEEP APNEA (ADULT) (PEDIATRIC): ICD-10-CM

## 2024-12-02 DIAGNOSIS — I48.19 OTHER PERSISTENT ATRIAL FIBRILLATION: ICD-10-CM

## 2024-12-02 PROCEDURE — 99214 OFFICE O/P EST MOD 30 MIN: CPT | Mod: 25

## 2024-12-02 PROCEDURE — 93000 ELECTROCARDIOGRAM COMPLETE: CPT

## 2024-12-31 NOTE — ED ADULT NURSE NOTE - NSSUSCREENINGQ3_ED_ALL_ED
Telemetry Verification   Patient placed on Telemetry Box  Verified on ED monitor  Box [id number]   Monitor Tech SR   Rate 76   Rhythm Pranay        No

## 2025-04-04 ENCOUNTER — OUTPATIENT (OUTPATIENT)
Dept: OUTPATIENT SERVICES | Facility: HOSPITAL | Age: 60
LOS: 1 days | End: 2025-04-04
Payer: MEDICARE

## 2025-04-04 DIAGNOSIS — Q67.6 PECTUS EXCAVATUM: Chronic | ICD-10-CM

## 2025-04-04 DIAGNOSIS — Z00.8 ENCOUNTER FOR OTHER GENERAL EXAMINATION: ICD-10-CM

## 2025-04-04 DIAGNOSIS — Z92.89 PERSONAL HISTORY OF OTHER MEDICAL TREATMENT: Chronic | ICD-10-CM

## 2025-04-04 PROCEDURE — 74181 MRI ABDOMEN W/O CONTRAST: CPT

## 2025-04-04 PROCEDURE — 74181 MRI ABDOMEN W/O CONTRAST: CPT | Mod: 26

## 2025-08-04 ENCOUNTER — APPOINTMENT (OUTPATIENT)
Dept: ELECTROPHYSIOLOGY | Facility: CLINIC | Age: 60
End: 2025-08-04
Payer: MEDICARE

## 2025-08-04 VITALS
HEART RATE: 63 BPM | DIASTOLIC BLOOD PRESSURE: 80 MMHG | BODY MASS INDEX: 28.88 KG/M2 | WEIGHT: 225 LBS | HEIGHT: 74 IN | SYSTOLIC BLOOD PRESSURE: 170 MMHG

## 2025-08-04 DIAGNOSIS — J98.4 OTHER DISORDERS OF LUNG: ICD-10-CM

## 2025-08-04 DIAGNOSIS — Z86.69 PERSONAL HISTORY OF OTHER DISEASES OF THE NERVOUS SYSTEM AND SENSE ORGANS: ICD-10-CM

## 2025-08-04 DIAGNOSIS — R39.9 UNSPECIFIED SYMPTOMS AND SIGNS INVOLVING THE GENITOURINARY SYSTEM: ICD-10-CM

## 2025-08-04 DIAGNOSIS — Z87.09 PERSONAL HISTORY OF OTHER DISEASES OF THE RESPIRATORY SYSTEM: ICD-10-CM

## 2025-08-04 DIAGNOSIS — Z87.898 PERSONAL HISTORY OF OTHER SPECIFIED CONDITIONS: ICD-10-CM

## 2025-08-04 DIAGNOSIS — E11.9 TYPE 2 DIABETES MELLITUS W/OUT COMPLICATIONS: ICD-10-CM

## 2025-08-04 DIAGNOSIS — I10 ESSENTIAL (PRIMARY) HYPERTENSION: ICD-10-CM

## 2025-08-04 DIAGNOSIS — I48.19 OTHER PERSISTENT ATRIAL FIBRILLATION: ICD-10-CM

## 2025-08-04 DIAGNOSIS — J34.89 OTHER SPECIFIED DISORDERS OF NOSE AND NASAL SINUSES: ICD-10-CM

## 2025-08-04 DIAGNOSIS — E66.9 OTHER DISORDERS OF LUNG: ICD-10-CM

## 2025-08-04 DIAGNOSIS — Z78.9 OTHER SPECIFIED HEALTH STATUS: ICD-10-CM

## 2025-08-04 DIAGNOSIS — E78.5 HYPERLIPIDEMIA, UNSPECIFIED: ICD-10-CM

## 2025-08-04 PROCEDURE — 93000 ELECTROCARDIOGRAM COMPLETE: CPT

## 2025-08-04 PROCEDURE — 99215 OFFICE O/P EST HI 40 MIN: CPT | Mod: 25

## 2025-08-04 RX ORDER — APIXABAN 5 MG/1
5 TABLET, FILM COATED ORAL
Qty: 180 | Refills: 3 | Status: ACTIVE | COMMUNITY
Start: 1900-01-01 | End: 1900-01-01